# Patient Record
Sex: FEMALE | Race: WHITE | NOT HISPANIC OR LATINO | Employment: OTHER | URBAN - METROPOLITAN AREA
[De-identification: names, ages, dates, MRNs, and addresses within clinical notes are randomized per-mention and may not be internally consistent; named-entity substitution may affect disease eponyms.]

---

## 2018-04-27 ENCOUNTER — OFFICE VISIT (OUTPATIENT)
Dept: CARDIOLOGY | Facility: MEDICAL CENTER | Age: 73
End: 2018-04-27
Payer: MEDICARE

## 2018-04-27 VITALS — WEIGHT: 125 LBS | HEIGHT: 66 IN | BODY MASS INDEX: 20.09 KG/M2

## 2018-04-27 DIAGNOSIS — Z01.818 PRE-OPERATIVE CLEARANCE: ICD-10-CM

## 2018-04-27 PROCEDURE — 99203 OFFICE O/P NEW LOW 30 MIN: CPT | Performed by: INTERNAL MEDICINE

## 2018-04-27 PROCEDURE — 93000 ELECTROCARDIOGRAM COMPLETE: CPT | Performed by: INTERNAL MEDICINE

## 2018-04-27 RX ORDER — ALBUTEROL SULFATE 90 UG/1
2 AEROSOL, METERED RESPIRATORY (INHALATION) EVERY 6 HOURS PRN
Status: ON HOLD | COMMUNITY
End: 2018-06-05

## 2018-04-27 RX ORDER — ALENDRONATE SODIUM 70 MG/1
70 TABLET ORAL
COMMUNITY

## 2018-04-27 ASSESSMENT — ENCOUNTER SYMPTOMS
HEADACHES: 0
FEVER: 0
PND: 0
DEPRESSION: 0
NERVOUS/ANXIOUS: 0
DIZZINESS: 0
BLURRED VISION: 0
EYE DISCHARGE: 0
MYALGIAS: 0
PALPITATIONS: 0
SHORTNESS OF BREATH: 0
NAUSEA: 0
COUGH: 0
CHILLS: 0
HEARTBURN: 1
BRUISES/BLEEDS EASILY: 0
WEIGHT LOSS: 1

## 2018-04-27 NOTE — PROGRESS NOTES
Chief Complaint   Patient presents with   • Procedure       Subjective:   Melba Olguin is a 73 y.o. female who presents today in consultation at the request of Dr. Yehuda Raya for clearance for surgical resection of colon cancer.  Patient reports some significant weight loss in the last 6 months.  Recent colonoscopy demonstrated colon cancer and surgical resection is planned.  Her prior history as above excellent health. No history of high blood pressure heart disease palpitations or chest discomfort  She does housework and walking with no symptoms of shortness of breath or chest discomfort or palpitations.    She's had some variable GI discomfort that has been treated with omeprazole. No apparent documented ulcers.    Social history: She is . No tobacco. No illicit drugs. No alcohol abuse.  Family history: No premature CAD    Office EKG today is normal    History reviewed. No pertinent past medical history.  History reviewed. No pertinent surgical history.  History reviewed. No pertinent family history.  Social History     Social History   • Marital status:      Spouse name: N/A   • Number of children: N/A   • Years of education: N/A     Occupational History   • Not on file.     Social History Main Topics   • Smoking status: Never Smoker   • Smokeless tobacco: Never Used   • Alcohol use Yes   • Drug use: No   • Sexual activity: Not on file     Other Topics Concern   • Not on file     Social History Narrative   • No narrative on file     Allergies   Allergen Reactions   • Eggs    • Pcn [Penicillins]    • Vitamin B Complex      All B vitamins      Outpatient Encounter Prescriptions as of 4/27/2018   Medication Sig Dispense Refill   • alendronate (FOSAMAX) 70 MG Tab Take 70 mg by mouth every 7 days.     • OMEPRAZOLE PO Take  by mouth.     • albuterol 108 (90 Base) MCG/ACT Aero Soln inhalation aerosol Inhale 2 Puffs by mouth every 6 hours as needed for Shortness of Breath.     • FLUOCINOLONE  "ACETONIDE OT Place  in ear.     • Polyethylene Glycol 3350 (MIRALAX PO) Take  by mouth.       No facility-administered encounter medications on file as of 4/27/2018.      Review of Systems   Constitutional: Positive for weight loss. Negative for chills, fever and malaise/fatigue.   Eyes: Negative for blurred vision and discharge.   Respiratory: Negative for cough and shortness of breath.    Cardiovascular: Negative for chest pain, palpitations, leg swelling and PND.   Gastrointestinal: Positive for heartburn. Negative for nausea.   Genitourinary: Negative for dysuria and urgency.   Musculoskeletal: Negative for myalgias.   Skin: Negative for itching and rash.   Neurological: Negative for dizziness and headaches.   Endo/Heme/Allergies: Negative for environmental allergies. Does not bruise/bleed easily.   Psychiatric/Behavioral: Negative for depression. The patient is not nervous/anxious.         Objective:   Ht 1.676 m (5' 6\")   Wt 56.7 kg (125 lb)   BMI 20.18 kg/m²     Physical Exam   Constitutional: She is oriented to person, place, and time.   N pleasant lady accompanied by    Cardiovascular: Normal rate, regular rhythm, normal heart sounds and intact distal pulses.  Exam reveals no gallop and no friction rub.    No murmur heard.  Pulmonary/Chest: Effort normal and breath sounds normal.   Abdominal: Soft. Bowel sounds are normal.   Musculoskeletal: She exhibits no edema.   Neurological: She is alert and oriented to person, place, and time.   Skin: Skin is warm and dry.       Assessment:     1. Pre-operative clearance  EKG       Medical Decision Making:  Today's Assessment / Status / Plan:   No clinical evidence or electrocardiographic evidence of cardiovascular disease.  Is considered low risk for surgery and anesthesia  Thank you for this consult  "

## 2018-04-27 NOTE — LETTER
[facility]   [address]   [address]  [place] [address]  Phone: [phone]  Fax: [phone]              Melba Olguin  1945    Encounter Date: 4/27/2018    Amol Chen M.D.          PROGRESS NOTE:  Chief Complaint   Patient presents with   • Procedure       Subjective:   Melba Olguin is a 73 y.o. female who presents today in consultation at the request of Dr. Yehuda Raya for clearance for surgical resection of colon cancer.  Patient reports some significant weight loss in the last 6 months.  Recent colonoscopy demonstrated colon cancer and surgical resection is planned.  Her prior history as above excellent health. No history of high blood pressure heart disease palpitations or chest discomfort  She does housework and walking with no symptoms of shortness of breath or chest discomfort or palpitations.    She's had some variable GI discomfort that has been treated with omeprazole. No apparent documented ulcers.    Social history: She is . No tobacco. No illicit drugs. No alcohol abuse.  Family history: No premature CAD    Office EKG today is normal    History reviewed. No pertinent past medical history.  History reviewed. No pertinent surgical history.  History reviewed. No pertinent family history.  Social History     Social History   • Marital status:      Spouse name: N/A   • Number of children: N/A   • Years of education: N/A     Occupational History   • Not on file.     Social History Main Topics   • Smoking status: Never Smoker   • Smokeless tobacco: Never Used   • Alcohol use Yes   • Drug use: No   • Sexual activity: Not on file     Other Topics Concern   • Not on file     Social History Narrative   • No narrative on file     Allergies   Allergen Reactions   • Eggs    • Pcn [Penicillins]    • Vitamin B Complex      All B vitamins      Outpatient Encounter Prescriptions as of 4/27/2018    "  Medication Sig Dispense Refill   • alendronate (FOSAMAX) 70 MG Tab Take 70 mg by mouth every 7 days.     • OMEPRAZOLE PO Take  by mouth.     • albuterol 108 (90 Base) MCG/ACT Aero Soln inhalation aerosol Inhale 2 Puffs by mouth every 6 hours as needed for Shortness of Breath.     • FLUOCINOLONE ACETONIDE OT Place  in ear.     • Polyethylene Glycol 3350 (MIRALAX PO) Take  by mouth.       No facility-administered encounter medications on file as of 4/27/2018.      Review of Systems   Constitutional: Positive for weight loss. Negative for chills, fever and malaise/fatigue.   Eyes: Negative for blurred vision and discharge.   Respiratory: Negative for cough and shortness of breath.    Cardiovascular: Negative for chest pain, palpitations, leg swelling and PND.   Gastrointestinal: Positive for heartburn. Negative for nausea.   Genitourinary: Negative for dysuria and urgency.   Musculoskeletal: Negative for myalgias.   Skin: Negative for itching and rash.   Neurological: Negative for dizziness and headaches.   Endo/Heme/Allergies: Negative for environmental allergies. Does not bruise/bleed easily.   Psychiatric/Behavioral: Negative for depression. The patient is not nervous/anxious.         Objective:   Ht 1.676 m (5' 6\")   Wt 56.7 kg (125 lb)   BMI 20.18 kg/m²      Physical Exam   Constitutional: She is oriented to person, place, and time.   N pleasant lady accompanied by    Cardiovascular: Normal rate, regular rhythm, normal heart sounds and intact distal pulses.  Exam reveals no gallop and no friction rub.    No murmur heard.  Pulmonary/Chest: Effort normal and breath sounds normal.   Abdominal: Soft. Bowel sounds are normal.   Musculoskeletal: She exhibits no edema.   Neurological: She is alert and oriented to person, place, and time.   Skin: Skin is warm and dry.       Assessment:     1. Pre-operative clearance  EKG       Medical Decision Making:  Today's Assessment / Status / Plan:   No clinical evidence " or electrocardiographic evidence of cardiovascular disease.  Is considered low risk for surgery and anesthesia  Thank you for this consult      Yehuda Raya M.D.  [address]  [address]  [facility] [address]  VIA Facsimile: [phone]

## 2018-05-02 ENCOUNTER — TELEPHONE (OUTPATIENT)
Dept: CARDIOLOGY | Facility: MEDICAL CENTER | Age: 73
End: 2018-05-02

## 2018-06-01 ENCOUNTER — NON-PROVIDER VISIT (OUTPATIENT)
Dept: WOUND CARE | Facility: MEDICAL CENTER | Age: 73
End: 2018-06-01
Attending: SURGERY
Payer: MEDICARE

## 2018-06-01 DIAGNOSIS — Z01.812 PRE-PROCEDURAL LABORATORY EXAMINATION: ICD-10-CM

## 2018-06-01 LAB
ANION GAP SERPL CALC-SCNC: 7 MMOL/L (ref 0–11.9)
BUN SERPL-MCNC: 19 MG/DL (ref 8–22)
CALCIUM SERPL-MCNC: 9.2 MG/DL (ref 8.5–10.5)
CHLORIDE SERPL-SCNC: 101 MMOL/L (ref 96–112)
CO2 SERPL-SCNC: 28 MMOL/L (ref 20–33)
CREAT SERPL-MCNC: 0.68 MG/DL (ref 0.5–1.4)
ERYTHROCYTE [DISTWIDTH] IN BLOOD BY AUTOMATED COUNT: 49.5 FL (ref 35.9–50)
GLUCOSE SERPL-MCNC: 94 MG/DL (ref 65–99)
HCT VFR BLD AUTO: 38.9 % (ref 37–47)
HGB BLD-MCNC: 12.2 G/DL (ref 12–16)
MCH RBC QN AUTO: 30.3 PG (ref 27–33)
MCHC RBC AUTO-ENTMCNC: 31.4 G/DL (ref 33.6–35)
MCV RBC AUTO: 96.5 FL (ref 81.4–97.8)
PLATELET # BLD AUTO: 280 K/UL (ref 164–446)
PMV BLD AUTO: 10.1 FL (ref 9–12.9)
POTASSIUM SERPL-SCNC: 4.4 MMOL/L (ref 3.6–5.5)
RBC # BLD AUTO: 4.03 M/UL (ref 4.2–5.4)
SODIUM SERPL-SCNC: 136 MMOL/L (ref 135–145)
WBC # BLD AUTO: 8.2 K/UL (ref 4.8–10.8)

## 2018-06-01 PROCEDURE — 99213 OFFICE O/P EST LOW 20 MIN: CPT

## 2018-06-01 PROCEDURE — 36415 COLL VENOUS BLD VENIPUNCTURE: CPT

## 2018-06-01 PROCEDURE — 80048 BASIC METABOLIC PNL TOTAL CA: CPT

## 2018-06-01 PROCEDURE — 85027 COMPLETE CBC AUTOMATED: CPT

## 2018-06-01 RX ORDER — TRIAMCINOLONE ACETONIDE OINTMENT USP, 0.05% 0.5 MG/G
OINTMENT TOPICAL PRN
Status: ON HOLD | COMMUNITY
End: 2018-06-05

## 2018-06-01 RX ORDER — NITROFURANTOIN MACROCRYSTALS 100 MG/1
100 CAPSULE ORAL 2 TIMES DAILY
Status: ON HOLD | COMMUNITY
End: 2018-06-08

## 2018-06-01 RX ORDER — OMEPRAZOLE 20 MG/1
20 CAPSULE, DELAYED RELEASE ORAL DAILY
COMMUNITY

## 2018-06-01 RX ORDER — MESALAMINE 1.2 G/1
2.4 TABLET, DELAYED RELEASE ORAL DAILY
COMMUNITY

## 2018-06-01 RX ORDER — LORATADINE 10 MG/1
10 TABLET ORAL
Status: ON HOLD | COMMUNITY
End: 2018-06-08

## 2018-06-02 NOTE — CERTIFICATION
"[facility]  [address], [address]  [place]  [address]  [phone] Fax#    Pre-surgical Ostomy Marking    Patient Name:  Melba Olguin  Date of Markin2018    Date of Surgery:2018  Surgeon: Dr. Ren Raya MD, Phd  Type of Surgery:robotic proctocolectomy with ileostomy  Diagnosis: UC, colorectal lesion      SUBJECTIVE \"I need to decide if I want an ileoanal reservoir or a permanent end ileostomy by Tuesday\"  HPI: Pt is a 73 year old lady with a hx of colorectal lesion and UC, who will undergo robotic proctocolectomy and ileostomy on  by Dr. Raya. She is referred to [facility] for pre-surgical stoma marking      Medications: (.med)   Current Outpatient Prescriptions:   •  Probiotic Product (ALIGN PO), Take  by mouth every day., Disp: , Rfl:   •  Loperamide HCl (IMODIUM PO), Take  by mouth as needed., Disp: , Rfl:   •  loratadine (CLARITIN) 10 MG Tab, Take 10 mg by mouth every day., Disp: , Rfl:   •  nitrofurantoin macro crystals (MACRODANTIN) 100 MG Cap, Take 100 mg by mouth., Disp: , Rfl:   •  omeprazole (PRILOSEC) 20 MG delayed-release capsule, Take 20 mg by mouth every day., Disp: , Rfl:   •  Multiple Vitamins-Calcium (VIACTIV MULTI-VITAMIN) Chew Tab, Take  by mouth every day., Disp: , Rfl:   •  TRIAMCINOLONE ACETONIDE, TOP, 0.05 % Ointment, by Apply externally route as needed., Disp: , Rfl:   •  mesalamine (LIALDA) 1.2 GM Tablet Delayed Response, Take 2.4 g by mouth every day., Disp: , Rfl:   •  alendronate (FOSAMAX) 70 MG Tab, Take 70 mg by mouth every 7 days., Disp: , Rfl:   •  albuterol 108 (90 Base) MCG/ACT Aero Soln inhalation aerosol, Inhale 2 Puffs by mouth every 6 hours as needed for Shortness of Breath., Disp: , Rfl:   •  Polyethylene Glycol 3350 (MIRALAX PO), Take  by mouth every day., Disp: , Rfl:     Allergies:  Bee; Eggs; Influenza vaccines; Pcn [penicillins]; Shellfish allergy; and Vitamin b complex        OBJECTIVE   Assist patient to " "identify potential stoma site within his/her line of site on a flat pouching surface, within the rectus muscle, away from belt line, bony prominences, existing scars and umbilicus.        Patient teaching:    __x__ Reviewed basic anatomy and function of the GI and/or  tract  __x__ Reviewed nature of surgical procedure.  __x__ Patient to view Ostomy DVD/youtube at home.  __x__ Pouching system demonstrated.      Reviewed/Provided patient with literature pertaining to:    ____ Colostomy   __x__ Ileostomy   ____ Urostomy   ____ Other__________________________________________________________.    Check marks indicated completion of the following:  __x__ Procedure explained to patient.  __x__ Rectus muscle identified with patient in supine position.  __x__ Appropriate abdominal quadrant for planned surgical procedure identified.  __x__ Existing scars identified.  __x__ Potential sites evaluated with patient:  __x__ Sitting.  __x__ Bending forward/twisting at waist.  __x__ Site(s) selected with respect to skin folds, creases, abdominal contours & belt line.     Special considerations:   ____ Wheel chair bound  ____ Child  ____ Continent procedure  ____ Patient with multiple stomas  __x__ Ambulatory    Potential site (s) marked with an \"X\":   Skin prepped with skin protectant wipe.   Alex applied with indelible marker.    Covered with a Tegaderm and picture framed with tape.            Abdomen Quadrant Marked:  ____ URQ  ____ ULQ    __x__ LRQ  ____ LLQ    Other: ___________________________________________________________________    ___________________________________________________________________      [facility] Team notified (ext. [phone]) :   __message left with report on VM 17:37hrs 06/01/2018____       ASSESSMENT/PLAN  -Inpatient ostomy nurses to see patient while hospitalized.   -Patient to return to [facility] to see ostomy RN post-discharge        Clinician Signature:  _Urbano Garner RN, " CFCN, CWS, FACCWS________________________________ Date:  _06/01/2018_________    Physician Signature:  ________________________________ Date:  __________

## 2018-06-05 ENCOUNTER — HOSPITAL ENCOUNTER (INPATIENT)
Facility: MEDICAL CENTER | Age: 73
LOS: 3 days | DRG: 331 | End: 2018-06-08
Attending: SURGERY | Admitting: SURGERY
Payer: MEDICARE

## 2018-06-05 DIAGNOSIS — Z71.89 OSTOMY NURSE CONSULTATION: ICD-10-CM

## 2018-06-05 DIAGNOSIS — G89.18 PAIN FOLLOWING SURGERY OR PROCEDURE: ICD-10-CM

## 2018-06-05 PROCEDURE — 700102 HCHG RX REV CODE 250 W/ 637 OVERRIDE(OP)

## 2018-06-05 PROCEDURE — 700102 HCHG RX REV CODE 250 W/ 637 OVERRIDE(OP): Performed by: NURSE PRACTITIONER

## 2018-06-05 PROCEDURE — 700111 HCHG RX REV CODE 636 W/ 250 OVERRIDE (IP)

## 2018-06-05 PROCEDURE — A9270 NON-COVERED ITEM OR SERVICE: HCPCS | Performed by: NURSE PRACTITIONER

## 2018-06-05 PROCEDURE — 700101 HCHG RX REV CODE 250

## 2018-06-05 PROCEDURE — 0DTE4ZZ RESECTION OF LARGE INTESTINE, PERCUTANEOUS ENDOSCOPIC APPROACH: ICD-10-PCS | Performed by: SURGERY

## 2018-06-05 PROCEDURE — 0D1B4Z4 BYPASS ILEUM TO CUTANEOUS, PERCUTANEOUS ENDOSCOPIC APPROACH: ICD-10-PCS | Performed by: SURGERY

## 2018-06-05 PROCEDURE — 88307 TISSUE EXAM BY PATHOLOGIST: CPT

## 2018-06-05 PROCEDURE — 501838 HCHG SUTURE GENERAL: Performed by: SURGERY

## 2018-06-05 PROCEDURE — 501570 HCHG TROCAR, SEPARATOR: Performed by: SURGERY

## 2018-06-05 PROCEDURE — 502714 HCHG ROBOTIC SURGERY SERVICES: Performed by: SURGERY

## 2018-06-05 PROCEDURE — 0DTP4ZZ RESECTION OF RECTUM, PERCUTANEOUS ENDOSCOPIC APPROACH: ICD-10-PCS | Performed by: SURGERY

## 2018-06-05 PROCEDURE — 88309 TISSUE EXAM BY PATHOLOGIST: CPT

## 2018-06-05 PROCEDURE — 160048 HCHG OR STATISTICAL LEVEL 1-5: Performed by: SURGERY

## 2018-06-05 PROCEDURE — 700111 HCHG RX REV CODE 636 W/ 250 OVERRIDE (IP): Performed by: NURSE PRACTITIONER

## 2018-06-05 PROCEDURE — 160002 HCHG RECOVERY MINUTES (STAT): Performed by: SURGERY

## 2018-06-05 PROCEDURE — 500301 HCHG CLIP, HEMOLOCK: Performed by: SURGERY

## 2018-06-05 PROCEDURE — 700101 HCHG RX REV CODE 250: Performed by: NURSE PRACTITIONER

## 2018-06-05 PROCEDURE — 07BB4ZX EXCISION OF MESENTERIC LYMPHATIC, PERCUTANEOUS ENDOSCOPIC APPROACH, DIAGNOSTIC: ICD-10-PCS | Performed by: SURGERY

## 2018-06-05 PROCEDURE — 8E0W4CZ ROBOTIC ASSISTED PROCEDURE OF TRUNK REGION, PERCUTANEOUS ENDOSCOPIC APPROACH: ICD-10-PCS | Performed by: SURGERY

## 2018-06-05 PROCEDURE — 700104 HCHG RX REV CODE 254

## 2018-06-05 PROCEDURE — 160042 HCHG SURGERY MINUTES - EA ADDL 1 MIN LEVEL 5: Performed by: SURGERY

## 2018-06-05 PROCEDURE — 160035 HCHG PACU - 1ST 60 MINS PHASE I: Performed by: SURGERY

## 2018-06-05 PROCEDURE — 502240 HCHG MISC OR SUPPLY RC 0272: Performed by: SURGERY

## 2018-06-05 PROCEDURE — 0DBU4ZZ EXCISION OF OMENTUM, PERCUTANEOUS ENDOSCOPIC APPROACH: ICD-10-PCS | Performed by: SURGERY

## 2018-06-05 PROCEDURE — 500002 HCHG ADHESIVE, DERMABOND: Performed by: SURGERY

## 2018-06-05 PROCEDURE — 160022 HCHG BLOCK: Performed by: SURGERY

## 2018-06-05 PROCEDURE — 160031 HCHG SURGERY MINUTES - 1ST 30 MINS LEVEL 5: Performed by: SURGERY

## 2018-06-05 PROCEDURE — 501428 HCHG STAPLER, CURVED: Performed by: SURGERY

## 2018-06-05 PROCEDURE — 500594 HCHG GELPORT: Performed by: SURGERY

## 2018-06-05 PROCEDURE — 160009 HCHG ANES TIME/MIN: Performed by: SURGERY

## 2018-06-05 PROCEDURE — 770006 HCHG ROOM/CARE - MED/SURG/GYN SEMI*

## 2018-06-05 PROCEDURE — 94760 N-INVAS EAR/PLS OXIMETRY 1: CPT

## 2018-06-05 PROCEDURE — A4314 CATH W/DRAINAGE 2-WAY LATEX: HCPCS | Performed by: SURGERY

## 2018-06-05 PROCEDURE — A9270 NON-COVERED ITEM OR SERVICE: HCPCS

## 2018-06-05 RX ORDER — SCOLOPAMINE TRANSDERMAL SYSTEM 1 MG/1
1 PATCH, EXTENDED RELEASE TRANSDERMAL
Status: DISCONTINUED | OUTPATIENT
Start: 2018-06-05 | End: 2018-06-08 | Stop reason: HOSPADM

## 2018-06-05 RX ORDER — DEXTROSE MONOHYDRATE, SODIUM CHLORIDE, AND POTASSIUM CHLORIDE 50; 1.49; 4.5 G/1000ML; G/1000ML; G/1000ML
INJECTION, SOLUTION INTRAVENOUS CONTINUOUS
Status: DISCONTINUED | OUTPATIENT
Start: 2018-06-05 | End: 2018-06-08 | Stop reason: HOSPADM

## 2018-06-05 RX ORDER — ACETAMINOPHEN 500 MG
TABLET ORAL
Status: COMPLETED
Start: 2018-06-05 | End: 2018-06-05

## 2018-06-05 RX ORDER — DIPHENHYDRAMINE HYDROCHLORIDE 50 MG/ML
25 INJECTION INTRAMUSCULAR; INTRAVENOUS EVERY 6 HOURS PRN
Status: DISCONTINUED | OUTPATIENT
Start: 2018-06-05 | End: 2018-06-08 | Stop reason: HOSPADM

## 2018-06-05 RX ORDER — DIPHENHYDRAMINE HCL 25 MG
25 TABLET ORAL EVERY 6 HOURS PRN
Status: DISCONTINUED | OUTPATIENT
Start: 2018-06-05 | End: 2018-06-08 | Stop reason: HOSPADM

## 2018-06-05 RX ORDER — POLYETHYLENE GLYCOL 3350 17 G/17G
238 POWDER, FOR SOLUTION ORAL
Status: ON HOLD | COMMUNITY
End: 2018-06-08

## 2018-06-05 RX ORDER — HALOPERIDOL 5 MG/ML
INJECTION INTRAMUSCULAR
Status: COMPLETED
Start: 2018-06-05 | End: 2018-06-05

## 2018-06-05 RX ORDER — GABAPENTIN 300 MG/1
CAPSULE ORAL
Status: COMPLETED
Start: 2018-06-05 | End: 2018-06-05

## 2018-06-05 RX ORDER — OXYCODONE HYDROCHLORIDE 5 MG/1
2.5 TABLET ORAL
Status: DISCONTINUED | OUTPATIENT
Start: 2018-06-05 | End: 2018-06-08 | Stop reason: HOSPADM

## 2018-06-05 RX ORDER — OMEPRAZOLE 20 MG/1
20 CAPSULE, DELAYED RELEASE ORAL DAILY
Status: DISCONTINUED | OUTPATIENT
Start: 2018-06-05 | End: 2018-06-08 | Stop reason: HOSPADM

## 2018-06-05 RX ORDER — ACETAMINOPHEN 500 MG
1000 TABLET ORAL EVERY 6 HOURS
Status: DISCONTINUED | OUTPATIENT
Start: 2018-06-05 | End: 2018-06-08 | Stop reason: HOSPADM

## 2018-06-05 RX ORDER — POLYETHYLENE GLYCOL 3350 17 G/17G
17 POWDER, FOR SOLUTION ORAL DAILY
Status: ON HOLD | COMMUNITY
End: 2018-06-08

## 2018-06-05 RX ORDER — HALOPERIDOL 5 MG/ML
1 INJECTION INTRAMUSCULAR EVERY 6 HOURS PRN
Status: DISCONTINUED | OUTPATIENT
Start: 2018-06-05 | End: 2018-06-08 | Stop reason: HOSPADM

## 2018-06-05 RX ORDER — CELECOXIB 200 MG/1
CAPSULE ORAL
Status: COMPLETED
Start: 2018-06-05 | End: 2018-06-05

## 2018-06-05 RX ORDER — LIDOCAINE HYDROCHLORIDE 10 MG/ML
0.5 INJECTION, SOLUTION INFILTRATION; PERINEURAL
Status: ACTIVE | OUTPATIENT
Start: 2018-06-05 | End: 2018-06-06

## 2018-06-05 RX ORDER — LOPERAMIDE HYDROCHLORIDE 2 MG/1
2 CAPSULE ORAL 4 TIMES DAILY PRN
Status: ON HOLD | COMMUNITY
End: 2018-06-08

## 2018-06-05 RX ORDER — ONDANSETRON 2 MG/ML
4 INJECTION INTRAMUSCULAR; INTRAVENOUS EVERY 4 HOURS PRN
Status: DISCONTINUED | OUTPATIENT
Start: 2018-06-05 | End: 2018-06-08 | Stop reason: HOSPADM

## 2018-06-05 RX ORDER — LORATADINE 10 MG/1
10 TABLET ORAL
Status: DISCONTINUED | OUTPATIENT
Start: 2018-06-05 | End: 2018-06-08 | Stop reason: HOSPADM

## 2018-06-05 RX ORDER — IBUPROFEN 800 MG/1
800 TABLET ORAL
Status: DISCONTINUED | OUTPATIENT
Start: 2018-06-05 | End: 2018-06-08 | Stop reason: HOSPADM

## 2018-06-05 RX ORDER — SODIUM CHLORIDE, SODIUM LACTATE, POTASSIUM CHLORIDE, CALCIUM CHLORIDE 600; 310; 30; 20 MG/100ML; MG/100ML; MG/100ML; MG/100ML
1000 INJECTION, SOLUTION INTRAVENOUS
Status: COMPLETED | OUTPATIENT
Start: 2018-06-05 | End: 2018-06-05

## 2018-06-05 RX ORDER — DEXAMETHASONE SODIUM PHOSPHATE 4 MG/ML
4 INJECTION, SOLUTION INTRA-ARTICULAR; INTRALESIONAL; INTRAMUSCULAR; INTRAVENOUS; SOFT TISSUE
Status: COMPLETED | OUTPATIENT
Start: 2018-06-05 | End: 2018-06-05

## 2018-06-05 RX ORDER — CALCIUM CARBONATE 500 MG/1
500 TABLET, CHEWABLE ORAL
Status: DISCONTINUED | OUTPATIENT
Start: 2018-06-05 | End: 2018-06-08 | Stop reason: HOSPADM

## 2018-06-05 RX ORDER — OXYCODONE HCL 5 MG/5 ML
SOLUTION, ORAL ORAL
Status: COMPLETED
Start: 2018-06-05 | End: 2018-06-05

## 2018-06-05 RX ADMIN — FENTANYL CITRATE 25 MCG: 50 INJECTION, SOLUTION INTRAMUSCULAR; INTRAVENOUS at 16:00

## 2018-06-05 RX ADMIN — ACETAMINOPHEN 1000 MG: 500 TABLET, FILM COATED ORAL at 20:20

## 2018-06-05 RX ADMIN — OXYCODONE HYDROCHLORIDE 10 MG: 5 SOLUTION ORAL at 16:30

## 2018-06-05 RX ADMIN — SODIUM CHLORIDE, SODIUM LACTATE, POTASSIUM CHLORIDE, CALCIUM CHLORIDE 1000 ML: 600; 310; 30; 20 INJECTION, SOLUTION INTRAVENOUS at 11:29

## 2018-06-05 RX ADMIN — HALOPERIDOL 1 MG: 5 INJECTION INTRAMUSCULAR at 16:30

## 2018-06-05 RX ADMIN — POTASSIUM CHLORIDE, DEXTROSE MONOHYDRATE AND SODIUM CHLORIDE: 150; 5; 450 INJECTION, SOLUTION INTRAVENOUS at 20:57

## 2018-06-05 RX ADMIN — HALOPERIDOL LACTATE 1 MG: 5 INJECTION, SOLUTION INTRAMUSCULAR at 16:30

## 2018-06-05 RX ADMIN — FENTANYL CITRATE 25 MCG: 50 INJECTION, SOLUTION INTRAMUSCULAR; INTRAVENOUS at 15:50

## 2018-06-05 RX ADMIN — DEXAMETHASONE SODIUM PHOSPHATE 4 MG: 4 INJECTION, SOLUTION INTRAMUSCULAR; INTRAVENOUS at 20:57

## 2018-06-05 RX ADMIN — CELECOXIB 400 MG: 200 CAPSULE ORAL at 12:00

## 2018-06-05 RX ADMIN — OXYCODONE HYDROCHLORIDE 2.5 MG: 5 TABLET ORAL at 20:20

## 2018-06-05 RX ADMIN — FENTANYL CITRATE 25 MCG: 50 INJECTION, SOLUTION INTRAMUSCULAR; INTRAVENOUS at 15:38

## 2018-06-05 RX ADMIN — ONDANSETRON 4 MG: 2 INJECTION INTRAMUSCULAR; INTRAVENOUS at 20:19

## 2018-06-05 RX ADMIN — GABAPENTIN 300 MG: 300 CAPSULE ORAL at 12:00

## 2018-06-05 RX ADMIN — FENTANYL CITRATE 25 MCG: 50 INJECTION, SOLUTION INTRAMUSCULAR; INTRAVENOUS at 15:33

## 2018-06-05 RX ADMIN — IBUPROFEN 800 MG: 800 TABLET, FILM COATED ORAL at 20:20

## 2018-06-05 RX ADMIN — ACETAMINOPHEN 1000 MG: 500 TABLET, FILM COATED ORAL at 12:00

## 2018-06-05 ASSESSMENT — PATIENT HEALTH QUESTIONNAIRE - PHQ9
2. FEELING DOWN, DEPRESSED, IRRITABLE, OR HOPELESS: NOT AT ALL
SUM OF ALL RESPONSES TO PHQ9 QUESTIONS 1 AND 2: 0
1. LITTLE INTEREST OR PLEASURE IN DOING THINGS: NOT AT ALL

## 2018-06-05 ASSESSMENT — PAIN SCALES - GENERAL
PAINLEVEL_OUTOF10: 3
PAINLEVEL_OUTOF10: 5
PAINLEVEL_OUTOF10: 0
PAINLEVEL_OUTOF10: 2
PAINLEVEL_OUTOF10: 5
PAINLEVEL_OUTOF10: 0
PAINLEVEL_OUTOF10: 0
PAINLEVEL_OUTOF10: 4
PAINLEVEL_OUTOF10: 3
PAINLEVEL_OUTOF10: 7
PAINLEVEL_OUTOF10: 0
PAINLEVEL_OUTOF10: 5

## 2018-06-05 ASSESSMENT — LIFESTYLE VARIABLES
AVERAGE NUMBER OF DAYS PER WEEK YOU HAVE A DRINK CONTAINING ALCOHOL: 1
HAVE PEOPLE ANNOYED YOU BY CRITICIZING YOUR DRINKING: NO
EVER_SMOKED: NEVER
EVER FELT BAD OR GUILTY ABOUT YOUR DRINKING: NO
CONSUMPTION TOTAL: NEGATIVE
TOTAL SCORE: 0
TOTAL SCORE: 0
ON A TYPICAL DAY WHEN YOU DRINK ALCOHOL HOW MANY DRINKS DO YOU HAVE: 1
HOW MANY TIMES IN THE PAST YEAR HAVE YOU HAD 5 OR MORE DRINKS IN A DAY: 0
ALCOHOL_USE: YES
EVER HAD A DRINK FIRST THING IN THE MORNING TO STEADY YOUR NERVES TO GET RID OF A HANGOVER: NO
HAVE YOU EVER FELT YOU SHOULD CUT DOWN ON YOUR DRINKING: NO
TOTAL SCORE: 0

## 2018-06-05 ASSESSMENT — COPD QUESTIONNAIRES
DURING THE PAST 4 WEEKS HOW MUCH DID YOU FEEL SHORT OF BREATH: SOME OF THE TIME
COPD SCREENING SCORE: 3
DO YOU EVER COUGH UP ANY MUCUS OR PHLEGM?: NO/ONLY WITH OCCASIONAL COLDS OR INFECTIONS
HAVE YOU SMOKED AT LEAST 100 CIGARETTES IN YOUR ENTIRE LIFE: NO/DON'T KNOW

## 2018-06-05 NOTE — OP REPORT
Operative Report    Date: 6/5/2018    Surgeon: Yehuda Raya M.D.    Assistant: Josee Mcgraw    Anesthesiologist: Andrei Dickerson MD    Pre-operative Diagnosis:   Ulcerative colitis  Colon cancer  Acid reflux  Asthma  Urinary incontinence    Post-operative Diagnosis: Same    Procedure:   1)  ROBOTIC ASSISTED LAPAROSCOPIC PROCTOCOLECTOMY WITH END ILEOSTOMY  2) omentectomy     Indications:   73-year-old female with new diagnosis of stricture long-standing history of ulcerative colitis found to have colon cancer and the decision was made to proceed with a little robotic assisted laparoscopic proctocolectomy with end ileostomy. An extensive PARQ conference was held with the patient and her family, in regard to options for restoration of continence including pouch versus per minute and ileostomy. The patient was made aware of the alternatives, including operative and non-operative: Expectant management. The risks of bleeding, infection, damage to surrounding structures, need for reoperation, peristomal hernia, hernia, fistula, leak, stroke, MI, and death were discussed with the patient. The patient was given a chance to ask questions, and all her questions were answered. The patient demonstrates adequate understanding, seems pleased with the plan, and wishes to proceed.    OPERATIVE FINDINGS: Pan colitis with obvious chronic disease and a stricture point at the rectosigmoid junction     Procedure in detail: After informed consent was obtained, the patient was taken to the operating room, placed in supine position on a pink pad. The patient underwent general endotracheal anesthesia without incident.  The patient's arms were tucked and the chest and shoulders were padded and secured to the operating room table.   The patient was then moved to lithotomy in yellowfin stirrups with all skin and joint surfaces padded and protected appropriately.The rectum was washed out with Betadine and the abdomen was prepped and  draped in a sterile fashion. A timeout was performed verifying the correct patient, procedure, site, positioning in availability of equipment prior to the start of surgery.    Operation was begun by placing a 5 mm periumbilical incision through which a 5 mm trocar was introduced into the abdomen using the Optiview technique. After pneumoperitoneum was achieved, additional trocars were placed, 15 mm in the right lower quadrant at the previously marked ileostomy site and 8 mm in the left upper quadrant. An 8 and a 5 mm assistant port were placed and the camera port was upsized to an 8 mm trocar as well. All trocars were placed with 0.5% Marcaine without epinephrine for local anesthesia.    The patient was positioned in steep Trendelenburg and right lateral decubitus and before the da Archana robotic system was docked the patient was noted to be securely  positioned on the table.  We took down the left lateral attachments of the sigmoid colon, identifying the left ureter which was preserved throughout the remainder of the procedure.  We then opened the retroperitoneal space in the right side and dissected in the bloodless plane, isolated the MORTEZA pedicle away from the pelvic nerves and ureters. The MORTEZA was then divided near its origin with a robotic stapler with a vascular load. We then used electrocautery to mobilize the left colon up to the splenic flexure, taking down omental attachments and adhesions.  Electrocautery was now used in the presacral space in the bloodless plane. Dissection was carried down and left and right laterally, freeing up the rectum. Dissection carried all the way down to the pelvic floor and digital rectal examination confirmed that the dissection had freed the rectum to the dentate line and then divided the rectum itself with a green load robotic staple fire.    We proceeded by grasping the cecum and elevating it and retracting it anteriorly and inferiorly to attempt the ileocolic vessel. The  ileocolic vessel was identified and then dissected with a harmonic scalpel and clipped with a robotic clip and divided using noah. Hemostasis was obtained. The plain underlying the right colon was then developed in superior fashion taking are to avoid injury to the duodenum. This was carried up to the level of the gallbladder fossa. We then turned our attention to the base of the right colon. The right ureter was identified and protected. The lateral attachments of the white line of Toldt were then divided with the harmonic scalpel. We then turned our attention to the gastrocolic ligament in the hepatic flexure and these are similarly divided with a harmonic scalpel.    I then made a midline incision in the epigastrium. This was carried down through the subcutaneous tissues to the linea alba and the abdominal cavity was then entered. Particularly remaining gastrocolic ligament. I then divided the terminal ileum. I extracted the entire specimen and passed it off the table. I removed the 15 mm trocar and created a ileostomy site by excising a trephine of skin. I then carried it down through the subcutaneous tissues with electrocautery. I incised the anterior rectus sheath. Rectus sheath muscles were then split with curved Mayos. I then divided the the posterior rectus sheath. Ileostomy was brought through this incision and found to be without twist or tension or stricture.    All members been changed clubbing gowns and a closing tray was used. I closed the midline incision with 2 #1 running PDS sutures. I closed the subcutaneous tissues with 3-0 interrupted Vicryls. All skin incisions were then closed with 4-0 Monocryl. Ileostomy was then matured in the manner of Jelena with interrupted 3-0 Vicryl sutures. And in the Dermabond is applied to all of the incisions. An ileostomy appliance was applied to the ileostomy.    Patient was extubated and taken to the postanesthesia care unit in stable condition. All sponge and  instrument counts were correct ×2.    Yehuda Raya MD PhD  [facility]  Colon and Rectal Surgeon  [phone]

## 2018-06-05 NOTE — PROGRESS NOTES
Patient wakes up confused to situation but easily reorientated.  tolerating PO, states she has very little pain. Daughter Calli notified of updates, awaiting room assignment

## 2018-06-05 NOTE — PROGRESS NOTES
The Medication Reconciliation process has been completed by interviewing the patient and family    Allergies have been reviewed  Antibiotic use in 30 days - nitrofurantoin    Home Pharmacy:  [facility]

## 2018-06-06 LAB
ANION GAP SERPL CALC-SCNC: 6 MMOL/L (ref 0–11.9)
BASOPHILS # BLD AUTO: 0.7 % (ref 0–1.8)
BASOPHILS # BLD: 0.09 K/UL (ref 0–0.12)
BUN SERPL-MCNC: 10 MG/DL (ref 8–22)
CALCIUM SERPL-MCNC: 8.8 MG/DL (ref 8.5–10.5)
CHLORIDE SERPL-SCNC: 103 MMOL/L (ref 96–112)
CO2 SERPL-SCNC: 26 MMOL/L (ref 20–33)
CREAT SERPL-MCNC: 0.72 MG/DL (ref 0.5–1.4)
EOSINOPHIL # BLD AUTO: 0 K/UL (ref 0–0.51)
EOSINOPHIL NFR BLD: 0 % (ref 0–6.9)
ERYTHROCYTE [DISTWIDTH] IN BLOOD BY AUTOMATED COUNT: 47.4 FL (ref 35.9–50)
GLUCOSE SERPL-MCNC: 194 MG/DL (ref 65–99)
HCT VFR BLD AUTO: 33.5 % (ref 37–47)
HGB BLD-MCNC: 10.5 G/DL (ref 12–16)
IMM GRANULOCYTES # BLD AUTO: 0.05 K/UL (ref 0–0.11)
IMM GRANULOCYTES NFR BLD AUTO: 0.4 % (ref 0–0.9)
LYMPHOCYTES # BLD AUTO: 0.39 K/UL (ref 1–4.8)
LYMPHOCYTES NFR BLD: 3.2 % (ref 22–41)
MCH RBC QN AUTO: 30.2 PG (ref 27–33)
MCHC RBC AUTO-ENTMCNC: 31.3 G/DL (ref 33.6–35)
MCV RBC AUTO: 96.3 FL (ref 81.4–97.8)
MONOCYTES # BLD AUTO: 0.8 K/UL (ref 0–0.85)
MONOCYTES NFR BLD AUTO: 6.5 % (ref 0–13.4)
NEUTROPHILS # BLD AUTO: 11.04 K/UL (ref 2–7.15)
NEUTROPHILS NFR BLD: 89.2 % (ref 44–72)
NRBC # BLD AUTO: 0 K/UL
NRBC BLD-RTO: 0 /100 WBC
PLATELET # BLD AUTO: 226 K/UL (ref 164–446)
PMV BLD AUTO: 9.9 FL (ref 9–12.9)
POTASSIUM SERPL-SCNC: 4.4 MMOL/L (ref 3.6–5.5)
RBC # BLD AUTO: 3.48 M/UL (ref 4.2–5.4)
SODIUM SERPL-SCNC: 135 MMOL/L (ref 135–145)
WBC # BLD AUTO: 12.4 K/UL (ref 4.8–10.8)

## 2018-06-06 PROCEDURE — 700111 HCHG RX REV CODE 636 W/ 250 OVERRIDE (IP): Performed by: NURSE PRACTITIONER

## 2018-06-06 PROCEDURE — A9270 NON-COVERED ITEM OR SERVICE: HCPCS | Performed by: NURSE PRACTITIONER

## 2018-06-06 PROCEDURE — 36415 COLL VENOUS BLD VENIPUNCTURE: CPT

## 2018-06-06 PROCEDURE — 80048 BASIC METABOLIC PNL TOTAL CA: CPT

## 2018-06-06 PROCEDURE — 770006 HCHG ROOM/CARE - MED/SURG/GYN SEMI*

## 2018-06-06 PROCEDURE — 700102 HCHG RX REV CODE 250 W/ 637 OVERRIDE(OP): Performed by: NURSE PRACTITIONER

## 2018-06-06 PROCEDURE — 85025 COMPLETE CBC W/AUTO DIFF WBC: CPT

## 2018-06-06 RX ADMIN — ACETAMINOPHEN 1000 MG: 500 TABLET, FILM COATED ORAL at 05:20

## 2018-06-06 RX ADMIN — OMEPRAZOLE 20 MG: 20 CAPSULE, DELAYED RELEASE ORAL at 09:47

## 2018-06-06 RX ADMIN — IBUPROFEN 800 MG: 800 TABLET, FILM COATED ORAL at 09:47

## 2018-06-06 RX ADMIN — ACETAMINOPHEN 1000 MG: 500 TABLET, FILM COATED ORAL at 00:11

## 2018-06-06 RX ADMIN — OXYCODONE HYDROCHLORIDE 2.5 MG: 5 TABLET ORAL at 14:48

## 2018-06-06 RX ADMIN — IBUPROFEN 800 MG: 800 TABLET, FILM COATED ORAL at 12:45

## 2018-06-06 RX ADMIN — IBUPROFEN 800 MG: 800 TABLET, FILM COATED ORAL at 18:27

## 2018-06-06 RX ADMIN — OXYCODONE HYDROCHLORIDE 2.5 MG: 5 TABLET ORAL at 00:11

## 2018-06-06 RX ADMIN — ENOXAPARIN SODIUM 40 MG: 100 INJECTION SUBCUTANEOUS at 09:51

## 2018-06-06 RX ADMIN — ACETAMINOPHEN 1000 MG: 500 TABLET, FILM COATED ORAL at 12:45

## 2018-06-06 RX ADMIN — OXYCODONE HYDROCHLORIDE 2.5 MG: 5 TABLET ORAL at 05:20

## 2018-06-06 RX ADMIN — ACETAMINOPHEN 1000 MG: 500 TABLET, FILM COATED ORAL at 23:12

## 2018-06-06 RX ADMIN — ANTACID TABLETS 500 MG: 500 TABLET, CHEWABLE ORAL at 19:48

## 2018-06-06 ASSESSMENT — PAIN SCALES - GENERAL
PAINLEVEL_OUTOF10: 3
PAINLEVEL_OUTOF10: 3
PAINLEVEL_OUTOF10: 5
PAINLEVEL_OUTOF10: 6
PAINLEVEL_OUTOF10: 3
PAINLEVEL_OUTOF10: 7
PAINLEVEL_OUTOF10: 3
PAINLEVEL_OUTOF10: 6
PAINLEVEL_OUTOF10: 7

## 2018-06-06 NOTE — PROGRESS NOTES
"Surgical Progress Note:    POD #1 S/P ROBOTIC ASSISTED LAPAROSCOPIC PROCTOCOLECTOMY WITH END ILEOSTOMY.  Doing well. Neg N/V, + ostomy output, Pain controlled, Denies chest pain or SOB.  Has not ambulated. Tolerating PO.    PE:  /56   Pulse 61   Temp 36.1 °C (96.9 °F)   Resp 14   Ht 1.676 m (5' 6\")   Wt 56.7 kg (125 lb)   SpO2 98%   Breastfeeding? No   BMI 20.18 kg/m²     I/O:   Intake/Output Summary (Last 24 hours) at 06/06/18 0631  Last data filed at 06/06/18 0400   Gross per 24 hour   Intake             1625 ml   Output             1085 ml   Net              540 ml     Review of Systems   Constitutional: Negative.  Negative for chills and fever.   Respiratory: Negative for shortness of breath.    Cardiovascular: Negative for chest pain.   Gastrointestinal: Negative for nausea and vomiting.        +flatus/+stool        Stoma pink and viable with flatus and stool  Genitourinary: Negative, noel in      Physical Exam   Constitutional:  appears well-developed.   Neck: Neck supple.   Cardiovascular: Normal rate.    Pulmonary/Chest: Effort normal.   Abdominal: Soft.  exhibits no distension. Appropriate tenderness.   Incisions clean, dry, and intact    Musculoskeletal: Normal range of motion.   Neurological:  alert.   Skin:  Warm and dry.   Extremities: cardenas, no edema    Labs:  Recent Labs      06/06/18   0348   WBC  12.4*   RBC  3.48*   HEMOGLOBIN  10.5*   HEMATOCRIT  33.5*   MCV  96.3   MCH  30.2   RDW  47.4   PLATELETCT  226   MPV  9.9   NEUTSPOLYS  89.20*   LYMPHOCYTES  3.20*   MONOCYTES  6.50   EOSINOPHILS  0.00   BASOPHILS  0.70     Recent Labs      06/06/18   0348   SODIUM  135   POTASSIUM  4.4   CHLORIDE  103   CO2  26   GLUCOSE  194*   BUN  10         A/P:   - Regular low residue diet as tolerated.  - IS Q One hour while awake  - Ambulate.  Out of bed for all meals please  - Pain controlled.  Cont PO pain medication  - Labs noted, recheck in am  - DVT propholaxis, ok to start Lovenox, sequentials " and ambulate  Pt to go home on Lovenox for 30 days  - Adequate urine output.  Cont, to monitor.  Will keep noel another day due to low pelvic surgery  - Ostomy nurse to see and start ostomy teaching  - IF continues to do well and tolerates PO and is comfortable with ostomy care, will plan for D/C prob Fri.     Discussed with Patient and Dr. Elver HUGHES  [facility]  [phone]

## 2018-06-06 NOTE — PROGRESS NOTES
Room available, per Fabiana PRYOR oncoming RN, patient can't be transported to room until after 1915 due to shift report. Patient to remain in pacu until after shift change

## 2018-06-06 NOTE — CARE PLAN
Problem: Safety  Goal: Will remain free from falls  Outcome: PROGRESSING AS EXPECTED  Non skid socks on. Bed locked and in low position. Call light within reach. Educated patient to use call light for assistance.     Problem: Knowledge Deficit  Goal: Knowledge of the prescribed therapeutic regimen will improve  Outcome: PROGRESSING AS EXPECTED  Reviewed prescribed orders with patient and family. All questions answered.

## 2018-06-06 NOTE — PROGRESS NOTES
Patient arrived to -[id number]. Patient walked from gurney to chair with hand held assist. Family at bedside. Assumed patient care  Patient is AOx4.  On 6 L O2 via facemask per ERAS   Abdominal pain rated at 7/10, medicated per MAR  Currently on clear liquid diet, advance as tolerated to GI soft diet  Patient complains of nausea, medicated per MAR  Patient has a midline incision and 2 abdominal lap sites with Dermabond, CDI  RLQ ileostomy, stoma is red. Small amount of brown drainage noted.  Hughes in place draining to gravity, to be discontinued on POD 2. Stat lock in place. Draining clear and yellow urine  IV fluids running, will discontine at 0300 per ERAS.   Oriented to room call light and smoking policy.  Reviewed plan of care (equipment, incentive spirometer, sequential compression devices, medications, activity, diet, fall precautions, skin care, and pain) with patient and family. Welcome packet given and reviewed with, all questions answered.

## 2018-06-06 NOTE — PROGRESS NOTES
2 RN skin check complete  Midline incision with 2 abdominal lap sites to patient's abdomen covered with dermabond  RLQ ileostomy   No other areas of skin breakdown noted   All bony prominences intact.

## 2018-06-06 NOTE — PROGRESS NOTES
AO x 4, family at bedside.  Ostomy with + stool output, brown liquid, stoma pink.  Midline incision and lap sites well approximated, no redness or drainage. Abdomen soft.  IS given and instructed on use, weak effort, achieves 500.  Noel to gravity with + urine, to be removed POD 2, noel care provided.  Ibuprofen provided for 7/10 abdomen.  No n/v and 75% of breakfast try consumed. Plan of care discussed, questions answered, verbalized understanding.

## 2018-06-06 NOTE — PROGRESS NOTES
Report received from Agnes RN in pacu, to send up in wheelchair per CHERELLE.  Updated family that patient wont be up until after 1915 due to change of shift.

## 2018-06-06 NOTE — PROGRESS NOTES
Patient stood at bedside with assist, still tolerating PO, pain well controlled with medication. Neuro status WDL, surgical sites well approximated, stoma pink, moist, no distress. Still awaiting room assignment, daughter updated

## 2018-06-06 NOTE — WOUND TEAM
"[facility]  Inpatient Services  New Ostomy Management & Teaching    HPI:  Reviewed  PMH: Reviewed   SH: Reviewed    Subjective:  \"I have trouble seeing and hearing.\"      Objective:  In bedside chair.  Transferred to bed.       Ostomy type:  End ileostomy    Stoma location:  RUQ   Stoma assessment:    Appearance:  Red, edematous     Size:  1\"    Protrusion:  Budded     Output:  Min, brown mush      MC jxn  Intact     Peristomal skin:  Intact      Ostomy Appliance (type and size):  2 piece 2 1/4 with paste ring     Interventions:  Went over all paperwork.  SS signed.  Patient and family observed as I preformed all steps.  Barrier removed.  Izzy-stomal skin cleansed with warm wash cloth.  Template made.  Barrier cut and paste ring applied to barrier.  Applied to skin, pouch attached and closed.              Pt education: Questions and concerns addressed with patient and family at bedside,      Evaluation:  Patient with  and daughter at bedside.  Family states they will be assisting patient with care.  Very engaged and involved.         Plan: Ostomy nurses to continue to follow for ostomy needs and teaching.  Will see patient daily as likely DC will be Friday.       Anticipated discharge needs: Supplies, supplier information, outpatient ostomy clinic   "

## 2018-06-07 LAB
ANION GAP SERPL CALC-SCNC: 7 MMOL/L (ref 0–11.9)
BASOPHILS # BLD AUTO: 0.2 % (ref 0–1.8)
BASOPHILS # BLD: 0.03 K/UL (ref 0–0.12)
BUN SERPL-MCNC: 18 MG/DL (ref 8–22)
CALCIUM SERPL-MCNC: 8.6 MG/DL (ref 8.5–10.5)
CHLORIDE SERPL-SCNC: 100 MMOL/L (ref 96–112)
CO2 SERPL-SCNC: 25 MMOL/L (ref 20–33)
CREAT SERPL-MCNC: 0.85 MG/DL (ref 0.5–1.4)
EOSINOPHIL # BLD AUTO: 0.02 K/UL (ref 0–0.51)
EOSINOPHIL NFR BLD: 0.2 % (ref 0–6.9)
ERYTHROCYTE [DISTWIDTH] IN BLOOD BY AUTOMATED COUNT: 46.7 FL (ref 35.9–50)
GLUCOSE SERPL-MCNC: 107 MG/DL (ref 65–99)
HCT VFR BLD AUTO: 27.2 % (ref 37–47)
HGB BLD-MCNC: 8.8 G/DL (ref 12–16)
IMM GRANULOCYTES # BLD AUTO: 0.05 K/UL (ref 0–0.11)
IMM GRANULOCYTES NFR BLD AUTO: 0.4 % (ref 0–0.9)
LYMPHOCYTES # BLD AUTO: 1.64 K/UL (ref 1–4.8)
LYMPHOCYTES NFR BLD: 12.9 % (ref 22–41)
MCH RBC QN AUTO: 30.3 PG (ref 27–33)
MCHC RBC AUTO-ENTMCNC: 32.4 G/DL (ref 33.6–35)
MCV RBC AUTO: 93.8 FL (ref 81.4–97.8)
MONOCYTES # BLD AUTO: 0.84 K/UL (ref 0–0.85)
MONOCYTES NFR BLD AUTO: 6.6 % (ref 0–13.4)
NEUTROPHILS # BLD AUTO: 10.11 K/UL (ref 2–7.15)
NEUTROPHILS NFR BLD: 79.7 % (ref 44–72)
NRBC # BLD AUTO: 0 K/UL
NRBC BLD-RTO: 0 /100 WBC
PLATELET # BLD AUTO: 199 K/UL (ref 164–446)
PMV BLD AUTO: 10.1 FL (ref 9–12.9)
POTASSIUM SERPL-SCNC: 3.8 MMOL/L (ref 3.6–5.5)
RBC # BLD AUTO: 2.9 M/UL (ref 4.2–5.4)
SODIUM SERPL-SCNC: 132 MMOL/L (ref 135–145)
WBC # BLD AUTO: 12.7 K/UL (ref 4.8–10.8)

## 2018-06-07 PROCEDURE — 302102 BAG OST N IMG 2.25IN 2PC (FECAL): Performed by: SURGERY

## 2018-06-07 PROCEDURE — 36415 COLL VENOUS BLD VENIPUNCTURE: CPT

## 2018-06-07 PROCEDURE — 700111 HCHG RX REV CODE 636 W/ 250 OVERRIDE (IP): Performed by: NURSE PRACTITIONER

## 2018-06-07 PROCEDURE — A9270 NON-COVERED ITEM OR SERVICE: HCPCS | Performed by: NURSE PRACTITIONER

## 2018-06-07 PROCEDURE — 700102 HCHG RX REV CODE 250 W/ 637 OVERRIDE(OP): Performed by: NURSE PRACTITIONER

## 2018-06-07 PROCEDURE — 85025 COMPLETE CBC W/AUTO DIFF WBC: CPT

## 2018-06-07 PROCEDURE — 770006 HCHG ROOM/CARE - MED/SURG/GYN SEMI*

## 2018-06-07 PROCEDURE — 302098 PASTE RING (FLAT): Performed by: SURGERY

## 2018-06-07 PROCEDURE — 302111 WAFER OST 2.25IN N IMG RD 2 PC (BARRIER): Performed by: SURGERY

## 2018-06-07 PROCEDURE — 80048 BASIC METABOLIC PNL TOTAL CA: CPT

## 2018-06-07 RX ADMIN — OMEPRAZOLE 20 MG: 20 CAPSULE, DELAYED RELEASE ORAL at 07:56

## 2018-06-07 RX ADMIN — ACETAMINOPHEN 1000 MG: 500 TABLET, FILM COATED ORAL at 05:01

## 2018-06-07 RX ADMIN — OXYCODONE HYDROCHLORIDE 2.5 MG: 5 TABLET ORAL at 03:52

## 2018-06-07 RX ADMIN — ACETAMINOPHEN 1000 MG: 500 TABLET, FILM COATED ORAL at 18:51

## 2018-06-07 RX ADMIN — IBUPROFEN 800 MG: 800 TABLET, FILM COATED ORAL at 07:56

## 2018-06-07 RX ADMIN — ACETAMINOPHEN 1000 MG: 500 TABLET, FILM COATED ORAL at 12:32

## 2018-06-07 RX ADMIN — IBUPROFEN 800 MG: 800 TABLET, FILM COATED ORAL at 12:32

## 2018-06-07 RX ADMIN — ACETAMINOPHEN 1000 MG: 500 TABLET, FILM COATED ORAL at 23:38

## 2018-06-07 RX ADMIN — ENOXAPARIN SODIUM 40 MG: 100 INJECTION SUBCUTANEOUS at 07:59

## 2018-06-07 RX ADMIN — OXYCODONE HYDROCHLORIDE 2.5 MG: 5 TABLET ORAL at 15:34

## 2018-06-07 RX ADMIN — IBUPROFEN 800 MG: 800 TABLET, FILM COATED ORAL at 18:51

## 2018-06-07 ASSESSMENT — PAIN SCALES - GENERAL
PAINLEVEL_OUTOF10: 4
PAINLEVEL_OUTOF10: 4
PAINLEVEL_OUTOF10: 7
PAINLEVEL_OUTOF10: 4
PAINLEVEL_OUTOF10: 5
PAINLEVEL_OUTOF10: 4
PAINLEVEL_OUTOF10: 8
PAINLEVEL_OUTOF10: 5
PAINLEVEL_OUTOF10: 7

## 2018-06-07 NOTE — CARE PLAN
Problem: Fluid Volume:  Goal: Will maintain balanced intake and output    Intervention: Monitor, educate, and encourage compliance with therapeutic intake of liquids  Po fluid intake encouraged.  Patient able to void several times post noel removal

## 2018-06-07 NOTE — WOUND TEAM
"[facility]  Inpatient Services  New Ostomy Management & Teaching    HPI:  Reviewed  PMH: Reviewed   SH: Reviewed    Subjective:  \"I'm a little nervous.\"      Objective:  In bedside chair.  Transferred to bed.       Ostomy type:  End ileostomy    Stoma location:  RUQ   Stoma assessment:    Appearance:  Red, edematous     Size:  1\"    Protrusion:  Budded     Output:  Min, brown mush      MC jxn  Intact     Peristomal skin:  Intact      Ostomy Appliance (type and size):  2 piece 2 1/4 with paste ring.  Used fomaflex moldable barrier today.     Interventions:  Reviewed all paperwork.  Marked catalog.  Family observed and asked questions throughout.  Patient removed Barrier.  Assisted patient in cleansing Izzy-stomal skin with warm wash cloth.  Patient traced barrier with template and attempted to cut.  Patient having trouble cutting barrier so I offered her to try moldable.  Patient and family observed moldable being stretched to fit.  Patient applied paste ring.  Assisted patient in applying barrier.  Patient attached pouch and closed end.             Pt education: Questions and concerns addressed with patient and family at bedside.  Patient agreed to practice emptying pouch today.  Confirmed with CNA and RN.    Evaluation:  Patient with  and daughter at bedside.  Family states they will be assisting patient with care.  Very engaged and involved.         Plan: Ostomy nurses to continue to follow for ostomy needs and teaching.  Will see patient daily as likely DC will be Friday.       Anticipated discharge needs: Supplies, supplier information, outpatient ostomy clinic   "

## 2018-06-07 NOTE — PROGRESS NOTES
Received report from day shift RN. Assumed patient care  Patient is AOx4  Family at bedside  Right abdomen pain rated at a 3/10, cold pack applied  +stool from RLQ ostomy.   Midline incision and lap sites to abdomen  Hughes draining to gravity. To be removed 6/7 at 0500  Complaints of heartburn, medicated per MAR  Denies nausea  Placed patient back on 2 L of O2, educated patient on IS use. Able to pull 500 at this time  Ambulates SBA  Patient calls appropriately for assistance  Call light within reach. Bed locked and in low position  All needs met at this time.

## 2018-06-07 NOTE — PROGRESS NOTES
"Surgical Progress Note:    POD #2 S/P ROBOTIC ASSISTED LAPAROSCOPIC PROCTOCOLECTOMY WITH END ILEOSTOMY.  Doing well. Neg N/V, + thick ostomy output, Pain controlled, Denies chest pain or SOB.   Ambulating. Tolerating PO. Has not started taking care of her ostomy yet    PE:  /62   Pulse 70   Temp 36.7 °C (98.1 °F)   Resp 17   Ht 1.676 m (5' 6\")   Wt 56.7 kg (125 lb)   SpO2 98%   Breastfeeding? No   BMI 20.18 kg/m²     I/O:   Intake/Output Summary (Last 24 hours) at 06/06/18 0631  Last data filed at 06/06/18 0400   Gross per 24 hour   Intake             1625 ml   Output             1085 ml   Net              540 ml     Review of Systems   Constitutional: Negative.  Negative for chills and fever.   Respiratory: Negative for shortness of breath.    Cardiovascular: Negative for chest pain.   Gastrointestinal: Negative for nausea and vomiting.        +flatus/+stool        Stoma pink and viable with flatus and thick stool  Genitourinary: Negative, noel out     Physical Exam   Constitutional:  appears well-developed.   Neck: Neck supple.   Cardiovascular: Normal rate.    Pulmonary/Chest: Effort normal.   Abdominal: Soft.  exhibits no distension. Appropriate tenderness.   Incisions clean, dry, and intact    Musculoskeletal: Normal range of motion.   Neurological:  alert.   Skin:  Warm and dry.   Extremities: cardenas, no edema    Labs:  Recent Labs      06/06/18   0348  06/07/18   0236   WBC  12.4*  12.7*   RBC  3.48*  2.90*   HEMOGLOBIN  10.5*  8.8*   HEMATOCRIT  33.5*  27.2*   MCV  96.3  93.8   MCH  30.2  30.3   RDW  47.4  46.7   PLATELETCT  226  199   MPV  9.9  10.1   NEUTSPOLYS  89.20*  79.70*   LYMPHOCYTES  3.20*  12.90*   MONOCYTES  6.50  6.60   EOSINOPHILS  0.00  0.20   BASOPHILS  0.70  0.20     Recent Labs      06/06/18   0348  06/07/18   0236   SODIUM  135  132*   POTASSIUM  4.4  3.8   CHLORIDE  103  100   CO2  26  25   GLUCOSE  194*  107*   BUN  10  18         A/P:   - Regular low residue diet as " tolerated.  - IS Q One hour while awake  - Ambulate.  Out of bed for all meals please  - Pain controlled.  Cont PO pain medication  - Labs noted, recheck in am  - drop in H&H, but suspect equalization, dont think she is bleeding  - DVT propholaxis, ok to cont Lovenox, sequentials and ambulate  Pt to go home on Lovenox for 30 days  - Adequate urine output.  Cont, to monitor.   - Ostomy nurse to see and cont ostomy teaching  - IF continues to do well and tolerates PO and is comfortable with ostomy care, will plan for D/C prob Fri.     Discussed with Patient and Dr. Elver HUGHES  [facility]  [phone]

## 2018-06-07 NOTE — PROGRESS NOTES
AO x 4 , sitting up in chair.  No nausea.  Ibuprofen given with crackers for pain level 7/10 in abdomen.  Ostomy with pink stoma, + stool and air . Awaiting post noel removal void, no urge to void at this time.  Instructed on IS again, weak effort and needs frequent prompts and coaching to use correctly.  Plan of care discussed, questions answered, verbalized understanding.

## 2018-06-07 NOTE — CARE PLAN
Problem: Safety  Goal: Will remain free from falls  Outcome: PROGRESSING AS EXPECTED  Patient is free from falls at this time. Patient calls appropriately for assistance. Call light within reach. Non skid socks on     Problem: Pain Management  Goal: Pain level will decrease to patient's comfort goal  Outcome: PROGRESSING AS EXPECTED  Patient declines additional pharmacologic intervention for pain at this time.

## 2018-06-08 VITALS
TEMPERATURE: 98 F | BODY MASS INDEX: 20.09 KG/M2 | SYSTOLIC BLOOD PRESSURE: 110 MMHG | RESPIRATION RATE: 16 BRPM | HEART RATE: 77 BPM | HEIGHT: 66 IN | WEIGHT: 125 LBS | OXYGEN SATURATION: 95 % | DIASTOLIC BLOOD PRESSURE: 69 MMHG

## 2018-06-08 PROBLEM — G89.18 PAIN FOLLOWING SURGERY OR PROCEDURE: Status: ACTIVE | Noted: 2018-06-08

## 2018-06-08 LAB
ANION GAP SERPL CALC-SCNC: 8 MMOL/L (ref 0–11.9)
BASOPHILS # BLD AUTO: 0.3 % (ref 0–1.8)
BASOPHILS # BLD: 0.03 K/UL (ref 0–0.12)
BUN SERPL-MCNC: 12 MG/DL (ref 8–22)
CALCIUM SERPL-MCNC: 8.8 MG/DL (ref 8.5–10.5)
CHLORIDE SERPL-SCNC: 104 MMOL/L (ref 96–112)
CO2 SERPL-SCNC: 27 MMOL/L (ref 20–33)
CREAT SERPL-MCNC: 0.68 MG/DL (ref 0.5–1.4)
EOSINOPHIL # BLD AUTO: 0.23 K/UL (ref 0–0.51)
EOSINOPHIL NFR BLD: 2.6 % (ref 0–6.9)
ERYTHROCYTE [DISTWIDTH] IN BLOOD BY AUTOMATED COUNT: 46.9 FL (ref 35.9–50)
GLUCOSE SERPL-MCNC: 99 MG/DL (ref 65–99)
HCT VFR BLD AUTO: 30.6 % (ref 37–47)
HGB BLD-MCNC: 9.8 G/DL (ref 12–16)
IMM GRANULOCYTES # BLD AUTO: 0.03 K/UL (ref 0–0.11)
IMM GRANULOCYTES NFR BLD AUTO: 0.3 % (ref 0–0.9)
LYMPHOCYTES # BLD AUTO: 1.3 K/UL (ref 1–4.8)
LYMPHOCYTES NFR BLD: 14.6 % (ref 22–41)
MCH RBC QN AUTO: 30.2 PG (ref 27–33)
MCHC RBC AUTO-ENTMCNC: 32 G/DL (ref 33.6–35)
MCV RBC AUTO: 94.2 FL (ref 81.4–97.8)
MONOCYTES # BLD AUTO: 0.62 K/UL (ref 0–0.85)
MONOCYTES NFR BLD AUTO: 7 % (ref 0–13.4)
NEUTROPHILS # BLD AUTO: 6.67 K/UL (ref 2–7.15)
NEUTROPHILS NFR BLD: 75.2 % (ref 44–72)
NRBC # BLD AUTO: 0 K/UL
NRBC BLD-RTO: 0 /100 WBC
PLATELET # BLD AUTO: 247 K/UL (ref 164–446)
PMV BLD AUTO: 10 FL (ref 9–12.9)
POTASSIUM SERPL-SCNC: 3.7 MMOL/L (ref 3.6–5.5)
RBC # BLD AUTO: 3.25 M/UL (ref 4.2–5.4)
SODIUM SERPL-SCNC: 139 MMOL/L (ref 135–145)
WBC # BLD AUTO: 8.9 K/UL (ref 4.8–10.8)

## 2018-06-08 PROCEDURE — 80048 BASIC METABOLIC PNL TOTAL CA: CPT

## 2018-06-08 PROCEDURE — 700102 HCHG RX REV CODE 250 W/ 637 OVERRIDE(OP): Performed by: NURSE PRACTITIONER

## 2018-06-08 PROCEDURE — 700111 HCHG RX REV CODE 636 W/ 250 OVERRIDE (IP): Performed by: NURSE PRACTITIONER

## 2018-06-08 PROCEDURE — 85025 COMPLETE CBC W/AUTO DIFF WBC: CPT

## 2018-06-08 PROCEDURE — A9270 NON-COVERED ITEM OR SERVICE: HCPCS | Performed by: NURSE PRACTITIONER

## 2018-06-08 PROCEDURE — 36415 COLL VENOUS BLD VENIPUNCTURE: CPT

## 2018-06-08 RX ORDER — OXYCODONE HYDROCHLORIDE 5 MG/1
2.5 TABLET ORAL
Qty: 15 TAB | Refills: 0 | Status: SHIPPED | OUTPATIENT
Start: 2018-06-08 | End: 2018-06-18

## 2018-06-08 RX ADMIN — OMEPRAZOLE 20 MG: 20 CAPSULE, DELAYED RELEASE ORAL at 08:21

## 2018-06-08 RX ADMIN — ANTACID TABLETS 500 MG: 500 TABLET, CHEWABLE ORAL at 06:39

## 2018-06-08 RX ADMIN — ACETAMINOPHEN 1000 MG: 500 TABLET, FILM COATED ORAL at 12:22

## 2018-06-08 RX ADMIN — ACETAMINOPHEN 1000 MG: 500 TABLET, FILM COATED ORAL at 06:40

## 2018-06-08 RX ADMIN — OXYCODONE HYDROCHLORIDE 2.5 MG: 5 TABLET ORAL at 12:23

## 2018-06-08 RX ADMIN — IBUPROFEN 800 MG: 800 TABLET, FILM COATED ORAL at 08:21

## 2018-06-08 RX ADMIN — IBUPROFEN 800 MG: 800 TABLET, FILM COATED ORAL at 12:22

## 2018-06-08 RX ADMIN — ONDANSETRON 4 MG: 2 INJECTION INTRAMUSCULAR; INTRAVENOUS at 08:21

## 2018-06-08 ASSESSMENT — PAIN SCALES - GENERAL: PAINLEVEL_OUTOF10: 0

## 2018-06-08 NOTE — PROGRESS NOTES
Received report from day shift RN and assumed care.   Patient is A+Ox4, pleasant and cooperative.  She is a standby assist to the bathroom.  She wears special sneakers while ambulating.  She has 2 lap stab sites, a small incision and an ostomy.  She is self caring for ostomy bag.  She is on RA.  Wound RN to meet tomorrow for final teaching for DC.  Her pain is being managed with scheduled tylenol.  No other concerns at this time.  Bed is locked and in lowest position.  Call light and belongings within reach.  Hourly rounding in place.

## 2018-06-08 NOTE — PROGRESS NOTES
"Surgical Progress Note:    POD #3 S/P ROBOTIC ASSISTED LAPAROSCOPIC PROCTOCOLECTOMY WITH END ILEOSTOMY.  Doing well. Neg N/V, + thick ostomy output, Pain controlled, Denies chest pain or SOB.   Ambulating. Tolerating PO. Is taking care of her ostomy.  Wants to go home    PE:  /61   Pulse 80   Temp 36.9 °C (98.4 °F)   Resp 17   Ht 1.676 m (5' 6\")   Wt 56.7 kg (125 lb)   SpO2 93%   Breastfeeding? No   BMI 20.18 kg/m²     I/O:   Intake/Output Summary (Last 24 hours) at 06/06/18 0631  Last data filed at 06/06/18 0400   Gross per 24 hour   Intake             1625 ml   Output             1085 ml   Net              540 ml     Review of Systems   Constitutional: Negative.  Negative for chills and fever.   Respiratory: Negative for shortness of breath.    Cardiovascular: Negative for chest pain.   Gastrointestinal: Negative for nausea and vomiting.        +flatus/+stool        Stoma pink and viable with flatus and thick stool  Genitourinary: Negative, noel out     Physical Exam   Constitutional:  appears well-developed.   Neck: Neck supple.   Cardiovascular: Normal rate.    Pulmonary/Chest: Effort normal.   Abdominal: Soft.  exhibits no distension. Appropriate tenderness.   Incisions clean, dry, and intact    Musculoskeletal: Normal range of motion.   Neurological:  alert.   Skin:  Warm and dry.   Extremities: cardenas, no edema    Labs:  Recent Labs      06/06/18 0348  06/07/18   0236  06/08/18   0423   WBC  12.4*  12.7*  8.9   RBC  3.48*  2.90*  3.25*   HEMOGLOBIN  10.5*  8.8*  9.8*   HEMATOCRIT  33.5*  27.2*  30.6*   MCV  96.3  93.8  94.2   MCH  30.2  30.3  30.2   RDW  47.4  46.7  46.9   PLATELETCT  226  199  247   MPV  9.9  10.1  10.0   NEUTSPOLYS  89.20*  79.70*  75.20*   LYMPHOCYTES  3.20*  12.90*  14.60*   MONOCYTES  6.50  6.60  7.00   EOSINOPHILS  0.00  0.20  2.60   BASOPHILS  0.70  0.20  0.30     Recent Labs      06/06/18   0348  06/07/18   0236  06/08/18   0423   SODIUM  135  132*  139   POTASSIUM  4.4  " 3.8  3.7   CHLORIDE  103  100  104   CO2  26  25  27   GLUCOSE  194*  107*  99   BUN  10  18  12         A/P:   - Regular low residue diet as tolerated.  - IS Q One hour while awake  - Ambulate.  Out of bed for all meals please  - Pain controlled.  Cont PO pain medication  - Labs stable  - DVT propholaxis, ok to cont Lovenox, sequentials and ambulate  No diagnosis of cancer was found on pathology so will not be dc'ing home on Lovenox  - Adequate urine output.  Cont, to monitor.   - Ostomy nurse to see and cont ostomy teaching, out pt referral  Will D/C home. Pt plans to stay in town 1-2 weeks prior to returning home. Discharge instructions given.  Precautions and limitations reviewed.  Pt stated understanding and agrees with this plan of care.  F/U in one week and prn.      Discussed with Patient and Dr. Elver HUGHES  [facility]  [phone]

## 2018-06-08 NOTE — DISCHARGE INSTRUCTIONS
Discharge Instructions    Discharged to home by car with relative. Discharged via wheelchair, hospital escort: Yes.  Special equipment needed: Not Applicable    Be sure to schedule a follow-up appointment with your primary care doctor or any specialists as instructed.     Discharge Plan:   Diet Plan: Discussed  Activity Level: Discussed  Confirmed Follow up Appointment: Appointment Scheduled  Confirmed Symptoms Management: Discussed  Medication Reconciliation Updated: Yes  Pneumococcal Vaccine Administered/Refused: Not given - Patient refused pneumococcal vaccine  Influenza Vaccine Indication: Patient Refuses    I understand that a diet low in cholesterol, fat, and sodium is recommended for good health. Unless I have been given specific instructions below for another diet, I accept this instruction as my diet prescription.   Other diet: low fiber    Special Instructions: None    · Is patient discharged on Warfarin / Coumadin?   No     Depression / Suicide Risk    As you are discharged from this [facility] facility, it is important to learn how to keep safe from harming yourself.    Recognize the warning signs:  · Abrupt changes in personality, positive or negative- including increase in energy   · Giving away possessions  · Change in eating patterns- significant weight changes-  positive or negative  · Change in sleeping patterns- unable to sleep or sleeping all the time   · Unwillingness or inability to communicate  · Depression  · Unusual sadness, discouragement and loneliness  · Talk of wanting to die  · Neglect of personal appearance   · Rebelliousness- reckless behavior  · Withdrawal from people/activities they love  · Confusion- inability to concentrate     If you or a loved one observes any of these behaviors or has concerns about self-harm, here's what you can do:  · Talk about it- your feelings and reasons for harming yourself  · Remove any means that you might use to hurt yourself (examples: pills,  rope, extension cords, firearm)  · Get professional help from the community (Mental Health, Substance Abuse, psychological counseling)  · Do not be alone:Call your Safe Contact- someone whom you trust who will be there for you.  · Call your local CRISIS HOTLINE 638-3393 or 621-527-2689  · Call your local Children's Mobile Crisis Response Team [place] (021) 057-7541 or www.V[employer] App  · Call the toll free National Suicide Prevention Hotlines   · National Suicide Prevention Lifeline 978-800-REBB (6011)  · National Hope Line Network 800-SUICIDE (267-5277)     DISCHARGE NURSING COMMUNICATION Start: 06/08/18 0630, CONTINUOUS, ROUTINE     Comments: Colectomy D/C instructions:     1. DIET: A low fiber diet is recommended.     The following foods are generally allowed on a low-fiber diet:     Enriched white bread or rolls without seeds   White rice, plain white pasta, noodles and macaroni   Crackers   Refined cereals such as Cream of Wheat   Pancakes or waffles made from white refined flour   Most canned or cooked fruits without skins, seeds or membranes   Fruit and vegetable juice with little or no pulp, fruit-flavored drinks and flavored ching   Canned or well-cooked vegetables without seeds, hulls or skins, such as carrots, potatoes and tomatoes   Tender meat, poultry and fish   Eggs   Tofu   Creamy peanut butter -- up to 2 tablespoons a day   Milk and foods made from milk, such as yogurt, pudding, ice cream, cheeses and sour cream -- up to 2 cups a day, including any used in cooking   Butter, margarine, oils and salad dressings without seeds   Desserts with no whole grains, seeds, nuts, raisins or coconut     You should avoid the following foods:     Whole-wheat or whole-grain breads, cereals and pasta   Brown or wild rice and other whole grains such as oats, kasha, barley, quinoa   Dried fruits and prune juice   Raw fruit, including those with seeds, skin or membranes, such as berries   Raw or undercooked  vegetables, including corn   Dried beans, peas and lentils   Seeds and nuts, and foods containing them   Coconut   Popcorn     If you're eating a low-fiber diet, a typical menu might look like this:     Breakfast:   1 glass milk   1 egg   1 slice of white toast with smooth jelly   1/2 cup canned peaches   Snack:   1 cup yogurt   Lunch:   1 to 2 cups of chicken noodle soup   Soda crackers   [place] of drained tuna with mayonnaise or salad dressing on white bread   Canned applesauce   Flavored water or iced tea     Snack:   White toast, bread or crackers   2 tablespoons creamy peanut butter   Flavored water     Dinner:   3 ounces lean meat, poultry or fish   1/2 cup white rice   1/2 cup cooked vegetables, such as carrots or green beans   1 enriched white dinner roll with butter   Hot tea     Prepare all foods so that they're tender. Good cooking methods include simmering, poaching, stewing, steaming and braising. Baking or microwaving in a covered dish is another option. Try to avoid roasting, broiling and grilling -- methods that tend to make foods dry and tough. You may also want to avoid fried foods and go easy on spices.   Keep in mind that you may have fewer bowel movements and smaller stools while you're following a low-fiber diet. To avoid constipation, you may need to drink extra fluids. Drink plenty of water unless your doctor tells you otherwise, and use juices and milk as noted.     2. ACTIVITIES: After discharge from the hospital, you may resume full routine activities as you feel up to them.  You have a 10 pound weight restriction for two weeks after surgery. This means no lifting anything heavier than a gallon of milk. Routine activities such as bathing, walking, going up and down stairs, and driving* (see below) are safe.     3. DRIVING: You may drive whenever you are off pain medications and are able to perform the activities needed to drive, i.e. turning, bending, twisting, etc.     4. BATHING: no  restrictions, unless you have a drain in place, in which case, showering is okay, but no baths or pools until after your first postoperative appointment.     5. PAIN MEDICATION: You will be given a prescription for pain medication at discharge. Please take these as directed. It is important to remember not to take medications on an empty stomach as this may cause nausea.     6. BOWEL FUNCTION: A few patients, after this operation, will develop either frequent or loose stools after meals. This usually corrects itself after a few days, to a few months.     7. APPOINTMENT: Contact our office at [phone] for a follow-up appointment in 1-2 weeks following your procedure.     8. CALL IF YOU HAVE: (1) Fevers to more than 101F, (2) Unusual chest or leg pain, (3) Drainage or fluid from incision that may be foul smelling, increased tenderness or soreness at the wound or the wound edges are no longer together, redness or swelling at the incision site. Please do not hesitate to call with any other questions.       If you have any additional questions, please do not hesitate to call the office and speak to either myself or the physician on call.     Yehuda Raya MD PhD   [facility]   Colon and Rectal Surgeon   [phone]

## 2018-06-08 NOTE — WOUND TEAM
"[facility]  Inpatient Services  New Ostomy Management & Teaching    HPI:  Reviewed  PMH: Reviewed   SH: Reviewed    Subjective:  \"I'm ready to go home.\"      Objective:  In bed.        Ostomy type:  End ileostomy    Stoma location:  RUQ   Stoma assessment:    Appearance:  Red, edematous     Size:  1\"    Protrusion:  Budded     Output:  Min, brown mush      MC jxn  Intact     Peristomal skin:  Intact      Ostomy Appliance (type and size):  2 piece 2 1/4 with paste ring.  Continued with fomaflex moldable barrier.     Interventions:  Family observed and asked questions throughout.  Patient removed Barrier.  Assisted patient in cleansing Izzy-stomal skin with warm wash cloth.  Patient removed plastic from moldable barrier, applied paste ring.  Assisted patient to apply to skin.  Patient attached and closed pouch.     Pt education: Questions and concerns addressed with patient and family at bedside.  Went over supplies.  Supplies given for DC.    Evaluation:  Patient with  and daughter at bedside.  Family states they will be assisting patient with care.  Very engaged and involved.         Plan: Ostomy nurses to continue to follow for ostomy needs and teaching.  Plan to DC today.         Anticipated discharge needs: Supplies, supplier information, outpatient ostomy clinic   "

## 2018-06-08 NOTE — DISCHARGE SUMMARY
Discharge Summary    CHIEF COMPLAINT ON ADMISSION  COLON MASS AND ULCERATIVE COLITIS     Reason for Admission  COLON MASS AND ULCERATIVE COLITIS     Admission Date  6/5/2018    CODE STATUS  Full Code    HPI & HOSPITAL COURSE  This is a 73 y.o. female here with COLON MASS AND ULCERATIVE COLITIS.  The patient post operative coarse was essentially unremarkable.  At the time of discharge she was afebrile, tolerating a regular diet and voiding normally.  Her general exam is unremarkable.  Her abdominal incisions are healing satisfactorily.  Patient has been counseled on normal expectations of an uncomplicated post operative coarse.  She was discharged on a regular diet.  She has restarted her pre-admission medications.  Her discharge medications are as below.   She is to follow up with Dr. Raya in one to two weeks and prn.  Discharge instructions were given. Precautions and limitations were reviewed. The patient was counseled regarding the benefits of narcotics for post-operative pain in the short term period. The patient was made aware of the alternatives, including heating pads, ice, and NSAID medication.    The risks of addiction, overdose, and respiratory depression, were discussed.  We discussed taking the medications as prescribed. We discussed other medications the patient is taking, and how these can interact. The patient was notified not to share the medications with others. We discussed warning signs of addiction.    I reviewed the NarcRx  program, and the patient deemed not to be at high risk for abuse, and had no concurrent prescriptions.    The patient understands that these medications are intended to be used in the short term for post-operative pain only.    The patient was given a chance to ask questions, and all her questions were answered. Discussion was undertaken with Layman's terms. The patient demonstrated adequate understanding. Consent was given in clear state of mind and consent was signed  preoperatively.   The patient has stated understanding and agrees with this plan of care.                            Pathological exam:    A. Terminal ileum, colon, and rectum:         Total colectomy demonstrating two tubular adenomas with no high          grade dysplasia noted, one present on the distal margin.  Also          appreciated are numerous diverticuli, some of which are          associated with chronic inflammation and abscess formation is          noted at area of stricture.  No histologic features suggestive          for active inflammatory bowel disease (ulcerative colitis)          noted.         Five lymph nodes negative for metastatic carcinoma identified          (0/5).         No malignancy identified.  B. Omentum:         Benign mature adipose tissue with no metastatic carcinoma          identified.       Therefore, she is discharged in good and stable condition to home with close outpatient follow-up.        Discharge Date  6/8/2018    FOLLOW UP ITEMS POST DISCHARGE  Ostomy supplies    DISCHARGE DIAGNOSES  Active Problems:    Pain following surgery or procedure POA: No  Resolved Problems:    * No resolved hospital problems. *      FOLLOW UP    Yehuda Raya M.D.  [address]  [address]  [facility] [address]  [phone]      1-2 weeks and prn      MEDICATIONS ON DISCHARGE     Medication List      START taking these medications      Instructions   oxyCODONE immediate-release 5 MG Tabs  Commonly known as:  ROXICODONE   Take 0.5 Tabs by mouth every 3 hours as needed for up to 10 days.  Dose:  2.5 mg        CONTINUE taking these medications      Instructions   alendronate 70 MG Tabs  Commonly known as:  FOSAMAX   Take 70 mg by mouth every Monday.  Dose:  70 mg     ALIGN PO   Take 1 Dose by mouth every day.  Dose:  1 Dose     mesalamine 1.2 GM Tbec  Commonly known as:  LIALDA   Take 2.4 g by mouth every day.  Dose:  2.4 g     omeprazole 20 MG delayed-release capsule  Commonly known as:   "PRILOSEC   Take 20 mg by mouth every day.  Dose:  20 mg     VIACTIV MULTI-VITAMIN Chew   Take 1 Tab by mouth every day.  Dose:  1 Tab        STOP taking these medications    CLARITIN 10 MG Tabs  Generic drug:  loratadine     loperamide 2 MG Caps  Commonly known as:  IMODIUM     nitrofurantoin macro crystals 100 MG Caps  Commonly known as:  MACRODANTIN     polyethylene glycol/lytes Pack  Commonly known as:  MIRALAX            Allergies  Allergies   Allergen Reactions   • Bee Swelling   • Eggs Diarrhea, Vomiting and Nausea     \"Diarrhea, Vomiting & Nausea\"  \"Scrambled eggs\"   Patient is okay to have foods containing egg ingredients    • Influenza Vaccines      Reaction-\"Couldn't feel anything from my waist down\"   • Pcn [Penicillins] Rash     Rash - many years ago      • Shellfish Allergy Unspecified     Pt has never had shellfish and does not know if she is allergic       DIET  Orders Placed This Encounter   Procedures   • Diet Order     Standing Status:   Standing     Number of Occurrences:   1     Order Specific Question:   Diet:     Answer:   Low Fiber(GI Soft) [2]     Order Specific Question:   Miscellaneous modifications:     Answer:   6 Small Meals [4]       ACTIVITY  As tolerated.  Weight bearing as tolerated    CONSULTATIONS  Outpatient ostomy clinic    PROCEDURES  1)  ROBOTIC ASSISTED LAPAROSCOPIC PROCTOCOLECTOMY WITH END ILEOSTOMY  2) omentectomy        LABORATORY  Lab Results   Component Value Date    SODIUM 139 06/08/2018    POTASSIUM 3.7 06/08/2018    CHLORIDE 104 06/08/2018    CO2 27 06/08/2018    GLUCOSE 99 06/08/2018    BUN 12 06/08/2018    CREATININE 0.68 06/08/2018        Lab Results   Component Value Date    WBC 8.9 06/08/2018    HEMOGLOBIN 9.8 (L) 06/08/2018    HEMATOCRIT 30.6 (L) 06/08/2018    PLATELETCT 247 06/08/2018        Total time of the discharge process exceeds 40 minutes.  "

## 2018-06-08 NOTE — CARE PLAN
Problem: Safety  Goal: Will remain free from falls  Outcome: PROGRESSING AS EXPECTED  Patient calls for assistance to the bathroom d/t plantar fasciitis    Problem: Knowledge Deficit  Goal: Knowledge of disease process/condition, treatment plan, diagnostic tests, and medications will improve  Outcome: PROGRESSING AS EXPECTED  Patient with self care to ostomy bag

## 2018-06-08 NOTE — PROGRESS NOTES
6845 Received report, introduced self to pt, reviewed labs, orders, allergies, code status    0900 DC delayed due to ostomy nurse scheduled to see     1300 DC education complete, pt verbalized understanding. Pt has ostomy supplies, self care with ostomy. Incisions on abdomen approximated and intact.   here to drive her home. Scripts in hand. Pt then wheeled off unit by Marisa FONSECA. No complications with DC

## 2018-06-09 ENCOUNTER — PATIENT OUTREACH (OUTPATIENT)
Dept: HEALTH INFORMATION MANAGEMENT | Facility: OTHER | Age: 73
End: 2018-06-09

## 2018-06-09 NOTE — PROGRESS NOTES
06/09/2018 0846 - Discharge Outreach - received VM from daughter of patient. Returned call on number that was left and LM. Tried calling other number for daughter - reached daughter, Calli. She stated she had not called. May have been daughter in law. She will call me back if needed.

## 2018-06-10 ENCOUNTER — HOSPITAL ENCOUNTER (EMERGENCY)
Facility: MEDICAL CENTER | Age: 73
End: 2018-06-10
Attending: EMERGENCY MEDICINE
Payer: MEDICARE

## 2018-06-10 VITALS
TEMPERATURE: 96.8 F | RESPIRATION RATE: 16 BRPM | HEART RATE: 73 BPM | WEIGHT: 125 LBS | SYSTOLIC BLOOD PRESSURE: 138 MMHG | DIASTOLIC BLOOD PRESSURE: 60 MMHG | OXYGEN SATURATION: 93 % | BODY MASS INDEX: 20.09 KG/M2 | HEIGHT: 66 IN

## 2018-06-10 DIAGNOSIS — E86.0 DEHYDRATION: ICD-10-CM

## 2018-06-10 DIAGNOSIS — R11.2 NON-INTRACTABLE VOMITING WITH NAUSEA, UNSPECIFIED VOMITING TYPE: ICD-10-CM

## 2018-06-10 LAB
ALBUMIN SERPL BCP-MCNC: 3.7 G/DL (ref 3.2–4.9)
ALBUMIN/GLOB SERPL: 1.2 G/DL
ALP SERPL-CCNC: 75 U/L (ref 30–99)
ALT SERPL-CCNC: 81 U/L (ref 2–50)
ANION GAP SERPL CALC-SCNC: 11 MMOL/L (ref 0–11.9)
APPEARANCE UR: CLEAR
AST SERPL-CCNC: 97 U/L (ref 12–45)
BACTERIA #/AREA URNS HPF: NEGATIVE /HPF
BASOPHILS # BLD AUTO: 0.3 % (ref 0–1.8)
BASOPHILS # BLD: 0.04 K/UL (ref 0–0.12)
BILIRUB SERPL-MCNC: 1.2 MG/DL (ref 0.1–1.5)
BILIRUB UR QL STRIP.AUTO: NEGATIVE
BUN SERPL-MCNC: 19 MG/DL (ref 8–22)
CALCIUM SERPL-MCNC: 9.6 MG/DL (ref 8.5–10.5)
CHLORIDE SERPL-SCNC: 90 MMOL/L (ref 96–112)
CO2 SERPL-SCNC: 31 MMOL/L (ref 20–33)
COLOR UR: YELLOW
CREAT SERPL-MCNC: 0.63 MG/DL (ref 0.5–1.4)
EOSINOPHIL # BLD AUTO: 0.3 K/UL (ref 0–0.51)
EOSINOPHIL NFR BLD: 2.5 % (ref 0–6.9)
EPI CELLS #/AREA URNS HPF: NEGATIVE /HPF
ERYTHROCYTE [DISTWIDTH] IN BLOOD BY AUTOMATED COUNT: 44.7 FL (ref 35.9–50)
GLOBULIN SER CALC-MCNC: 3.1 G/DL (ref 1.9–3.5)
GLUCOSE SERPL-MCNC: 108 MG/DL (ref 65–99)
GLUCOSE UR STRIP.AUTO-MCNC: NEGATIVE MG/DL
HCT VFR BLD AUTO: 36.1 % (ref 37–47)
HGB BLD-MCNC: 11.9 G/DL (ref 12–16)
HYALINE CASTS #/AREA URNS LPF: ABNORMAL /LPF
IMM GRANULOCYTES # BLD AUTO: 0.05 K/UL (ref 0–0.11)
IMM GRANULOCYTES NFR BLD AUTO: 0.4 % (ref 0–0.9)
KETONES UR STRIP.AUTO-MCNC: 40 MG/DL
LEUKOCYTE ESTERASE UR QL STRIP.AUTO: NEGATIVE
LIPASE SERPL-CCNC: 15 U/L (ref 11–82)
LYMPHOCYTES # BLD AUTO: 1.17 K/UL (ref 1–4.8)
LYMPHOCYTES NFR BLD: 9.7 % (ref 22–41)
MCH RBC QN AUTO: 30.7 PG (ref 27–33)
MCHC RBC AUTO-ENTMCNC: 33.8 G/DL (ref 33.6–35)
MCV RBC AUTO: 90.9 FL (ref 81.4–97.8)
MICRO URNS: ABNORMAL
MONOCYTES # BLD AUTO: 0.96 K/UL (ref 0–0.85)
MONOCYTES NFR BLD AUTO: 8 % (ref 0–13.4)
NEUTROPHILS # BLD AUTO: 9.51 K/UL (ref 2–7.15)
NEUTROPHILS NFR BLD: 79.1 % (ref 44–72)
NITRITE UR QL STRIP.AUTO: NEGATIVE
NRBC # BLD AUTO: 0 K/UL
NRBC BLD-RTO: 0 /100 WBC
PH UR STRIP.AUTO: 5.5 [PH]
PLATELET # BLD AUTO: 371 K/UL (ref 164–446)
PMV BLD AUTO: 9.3 FL (ref 9–12.9)
POTASSIUM SERPL-SCNC: 3.7 MMOL/L (ref 3.6–5.5)
PROT SERPL-MCNC: 6.8 G/DL (ref 6–8.2)
PROT UR QL STRIP: NEGATIVE MG/DL
RBC # BLD AUTO: 3.94 M/UL (ref 4.2–5.4)
RBC # URNS HPF: ABNORMAL /HPF
RBC UR QL AUTO: ABNORMAL
SODIUM SERPL-SCNC: 132 MMOL/L (ref 135–145)
SP GR UR STRIP.AUTO: 1.01
UROBILINOGEN UR STRIP.AUTO-MCNC: 0.2 MG/DL
WBC # BLD AUTO: 12 K/UL (ref 4.8–10.8)
WBC #/AREA URNS HPF: ABNORMAL /HPF

## 2018-06-10 PROCEDURE — A9270 NON-COVERED ITEM OR SERVICE: HCPCS | Performed by: EMERGENCY MEDICINE

## 2018-06-10 PROCEDURE — 81001 URINALYSIS AUTO W/SCOPE: CPT

## 2018-06-10 PROCEDURE — 700105 HCHG RX REV CODE 258: Performed by: EMERGENCY MEDICINE

## 2018-06-10 PROCEDURE — 700111 HCHG RX REV CODE 636 W/ 250 OVERRIDE (IP): Performed by: EMERGENCY MEDICINE

## 2018-06-10 PROCEDURE — 83690 ASSAY OF LIPASE: CPT

## 2018-06-10 PROCEDURE — 85025 COMPLETE CBC W/AUTO DIFF WBC: CPT

## 2018-06-10 PROCEDURE — 96360 HYDRATION IV INFUSION INIT: CPT

## 2018-06-10 PROCEDURE — 99284 EMERGENCY DEPT VISIT MOD MDM: CPT

## 2018-06-10 PROCEDURE — 80053 COMPREHEN METABOLIC PANEL: CPT

## 2018-06-10 PROCEDURE — 700102 HCHG RX REV CODE 250 W/ 637 OVERRIDE(OP): Performed by: EMERGENCY MEDICINE

## 2018-06-10 RX ORDER — ONDANSETRON 4 MG/1
4 TABLET, ORALLY DISINTEGRATING ORAL EVERY 8 HOURS PRN
Qty: 10 TAB | Refills: 2 | Status: SHIPPED | OUTPATIENT
Start: 2018-06-10

## 2018-06-10 RX ORDER — OMEPRAZOLE 20 MG/1
20 CAPSULE, DELAYED RELEASE ORAL ONCE
Status: COMPLETED | OUTPATIENT
Start: 2018-06-10 | End: 2018-06-10

## 2018-06-10 RX ORDER — ONDANSETRON 4 MG/1
4 TABLET, ORALLY DISINTEGRATING ORAL ONCE
Status: COMPLETED | OUTPATIENT
Start: 2018-06-10 | End: 2018-06-10

## 2018-06-10 RX ORDER — SODIUM CHLORIDE 9 MG/ML
1000 INJECTION, SOLUTION INTRAVENOUS ONCE
Status: COMPLETED | OUTPATIENT
Start: 2018-06-10 | End: 2018-06-10

## 2018-06-10 RX ADMIN — ONDANSETRON 4 MG: 4 TABLET, ORALLY DISINTEGRATING ORAL at 09:15

## 2018-06-10 RX ADMIN — SODIUM CHLORIDE 1000 ML: 9 INJECTION, SOLUTION INTRAVENOUS at 09:15

## 2018-06-10 RX ADMIN — OMEPRAZOLE 20 MG: 20 CAPSULE, DELAYED RELEASE ORAL at 11:49

## 2018-06-10 ASSESSMENT — PAIN SCALES - GENERAL: PAINLEVEL_OUTOF10: 0

## 2018-06-10 NOTE — ED PROVIDER NOTES
"ED Provider Note    CHIEF COMPLAINT  Chief Complaint   Patient presents with   • Post-Op Complications   • N/V   • Abdominal Pain       HPI  Melba Olguin is a 73 y.o. female who presents day 5 after a colectomy and ostomy placement secondary to ulcerative colitis.  Pathology has been reported as negative for cancer.  At home, patient has been intermittently vomiting, sometimes within an hour of eating, other ×7 hours later.  Family was concerned about possible dehydration.  She has had normal ostomy output.  Onset of symptoms was after leaving the hospital.  Family notes that the patient is not taking nausea medication but had been receiving this medication while in hospital.  No fever.  No shortness of breath.  No chest pain.  The patient does have history of urinary tract infection although denies urinary symptoms at this time    REVIEW OF SYSTEMS  Constitutional: Gen. malaise, no fever  Respiratory: No shortness of breath  Cardiac: No chest pain or syncope  Gastrointestinal: Patient states abdominal pain following the surgery has been improving daily.  Episodes of vomiting  Musculoskeletal: No acute neck or back pain  Neurologic: No headache  Genitourinary: Recent bladder infection       All other systems are negative.     PAST MEDICAL HISTORY  Past Medical History:   Diagnosis Date   • Acid reflux 06/01/2018   • Arthritis 06/01/2018   • Asthma 06/01/2018    Uses Inhaler PRN   • Bladder infection 06/01/2018    Pt. currently being treated at this time & states Dr. Raya is aware of this   • Bowel habit changes 06/01/2018    \"Constipation/Diarrhea\"   • Breath shortness 06/01/2018    \"From Asthma & Allergies Sometimes\"   • Egg allergy 06/01/2018   • Falls     H/O   • Heart burn 06/01/2018   • Indigestion 06/01/2018   • Pain 06/01/2018    \"Feet\"   • Plantar fasciitis 06/01/2018   • Sensitive skin 06/01/2018   • UC [facility]ulcerative colitis) ([facility])    • Urinary bladder disorder 06/01/2018    \"Bladder infection & DrRachel " "Elver is aware of this\"   • Urinary incontinence 06/01/2018       FAMILY HISTORY  No family history on file.    SOCIAL HISTORY  Social History     Social History   • Marital status:      Spouse name: N/A   • Number of children: N/A   • Years of education: N/A     Social History Main Topics   • Smoking status: Never Smoker   • Smokeless tobacco: Never Used   • Alcohol use Yes      Comment: 1/day   • Drug use: No   • Sexual activity: Not on file     Other Topics Concern   • Not on file     Social History Narrative   • No narrative on file       SURGICAL HISTORY  Past Surgical History:   Procedure Laterality Date   • LOW ANTERIOR RESECTION ROBOTIC XI  6/5/2018    Procedure: robotic assisted laparoscopic proctocolectomy with end ileostomy and omentectomy;  Surgeon: Yehuda Raya M.D.;  Location: SURGERY [facility];  Service: General   • GYN SURGERY  2001    Hysterectomy   • TUBAL LIGATION  1972   • APPENDECTOMY     • LUMPECTOMY      1972       CURRENT MEDICATIONS  Home Medications    **Home medications have not yet been reviewed for this encounter**         ALLERGIES  Allergies   Allergen Reactions   • Bee Swelling   • Eggs Diarrhea, Vomiting and Nausea     \"Diarrhea, Vomiting & Nausea\"  \"Scrambled eggs\"   Patient is okay to have foods containing egg ingredients    • Influenza Vaccines      Reaction-\"Couldn't feel anything from my waist down\"   • Pcn [Penicillins] Rash     Rash - many years ago      • Shellfish Allergy Unspecified     Pt has never had shellfish and does not know if she is allergic       PHYSICAL EXAM  VITAL SIGNS: /60   Pulse 73   Temp 36 °C (96.8 °F)   Resp 16   Ht 1.676 m (5' 6\")   Wt 56.7 kg (125 lb)   SpO2 93%   BMI 20.18 kg/m²   Constitutional: Nontoxic appearance  ENT: Nares clear, mucous membranes mostly dry.  Eyes:  Conjunctiva normal, No discharge.    Lymphatic: No adenopathy.   Cardiovascular: Normal heart rate, Normal rhythm.   Pulmonary: No wheezing, no " rales  Gastrointestinal: Tenderness adjacent to ostomy.  No peritoneal signs.  Abdomen is soft  Skin: Warm, Dry, No erythema, No evidence of wound infection.   Musculoskeletal:  No CVA tenderness.   Neurologic: Alert & cooperative, Normal motor and sensory function, No focal deficits noted.   Psychiatric:Normal affect, Normal mood.    RADIOLOGY/PROCEDURES/Labs  Results for orders placed or performed during the hospital encounter of 06/10/18   CBC WITH DIFFERENTIAL   Result Value Ref Range    WBC 12.0 (H) 4.8 - 10.8 K/uL    RBC 3.94 (L) 4.20 - 5.40 M/uL    Hemoglobin 11.9 (L) 12.0 - 16.0 g/dL    Hematocrit 36.1 (L) 37.0 - 47.0 %    MCV 90.9 81.4 - 97.8 fL    MCH 30.7 27.0 - 33.0 pg    MCHC 33.8 33.6 - 35.0 g/dL    RDW 44.7 35.9 - 50.0 fL    Platelet Count 371 164 - 446 K/uL    MPV 9.3 9.0 - 12.9 fL    Neutrophils-Polys 79.10 (H) 44.00 - 72.00 %    Lymphocytes 9.70 (L) 22.00 - 41.00 %    Monocytes 8.00 0.00 - 13.40 %    Eosinophils 2.50 0.00 - 6.90 %    Basophils 0.30 0.00 - 1.80 %    Immature Granulocytes 0.40 0.00 - 0.90 %    Nucleated RBC 0.00 /100 WBC    Neutrophils (Absolute) 9.51 (H) 2.00 - 7.15 K/uL    Lymphs (Absolute) 1.17 1.00 - 4.80 K/uL    Monos (Absolute) 0.96 (H) 0.00 - 0.85 K/uL    Eos (Absolute) 0.30 0.00 - 0.51 K/uL    Baso (Absolute) 0.04 0.00 - 0.12 K/uL    Immature Granulocytes (abs) 0.05 0.00 - 0.11 K/uL    NRBC (Absolute) 0.00 K/uL   COMP METABOLIC PANEL   Result Value Ref Range    Sodium 132 (L) 135 - 145 mmol/L    Potassium 3.7 3.6 - 5.5 mmol/L    Chloride 90 (L) 96 - 112 mmol/L    Co2 31 20 - 33 mmol/L    Anion Gap 11.0 0.0 - 11.9    Glucose 108 (H) 65 - 99 mg/dL    Bun 19 8 - 22 mg/dL    Creatinine 0.63 0.50 - 1.40 mg/dL    Calcium 9.6 8.5 - 10.5 mg/dL    AST(SGOT) 97 (H) 12 - 45 U/L    ALT(SGPT) 81 (H) 2 - 50 U/L    Alkaline Phosphatase 75 30 - 99 U/L    Total Bilirubin 1.2 0.1 - 1.5 mg/dL    Albumin 3.7 3.2 - 4.9 g/dL    Total Protein 6.8 6.0 - 8.2 g/dL    Globulin 3.1 1.9 - 3.5 g/dL    A-G  Ratio 1.2 g/dL   URINALYSIS,CULTURE IF INDICATED   Result Value Ref Range    Color Yellow     Character Clear     Specific Gravity 1.006 <1.035    Ph 5.5 5.0 - 8.0    Glucose Negative Negative mg/dL    Ketones 40 (A) Negative mg/dL    Protein Negative Negative mg/dL    Bilirubin Negative Negative    Urobilinogen, Urine 0.2 Negative    Nitrite Negative Negative    Leukocyte Esterase Negative Negative    Occult Blood Trace (A) Negative    Micro Urine Req Microscopic    LIPASE   Result Value Ref Range    Lipase 15 11 - 82 U/L   ESTIMATED GFR   Result Value Ref Range    GFR If African American >60 >60 mL/min/1.73 m 2    GFR If Non African American >60 >60 mL/min/1.73 m 2   URINE MICROSCOPIC (W/UA)   Result Value Ref Range    WBC 0-2 /hpf    RBC 2-5 (A) /hpf    Bacteria Negative None /hpf    Epithelial Cells Negative /hpf    Hyaline Cast 0-2 /lpf         COURSE & MEDICAL DECISION MAKING  Pertinent Labs & Imaging studies reviewed. (See chart for details)  Patient has leukocytosis of 12, in comparison with previous labs approximately the same.  There was no fever and no evidence of worsening pain.  Urinalysis negative for infection.  Hemoglobin is improving.  Patient was given IV fluids, 1 L normal saline for evidence of dehydration, she felt improved following this fluid.  Patient was given Zofran for nausea, later demonstrating the ability to drink water without vomiting.  They're given a prescription for Zofran.  The family feels comfortable at this point taking her home and all have expressed willingness to return if worse.  They're advised follow-up with surgeon as scheduled.  Patient was discharged in improved condition    FINAL IMPRESSION  1. Non-intractable vomiting with nausea, unspecified vomiting type    2. Dehydration            Electronically signed by: Elver Kirby, 6/10/2018 3:01 PM

## 2018-06-10 NOTE — ED TRIAGE NOTES
Pt d/c'd from hospital on Friday after ileostomy placed for ulcerative colitis. Pt c/o increased nausea and vomiting. Per pt stool is coming out of ostomy. Pt also c/o chills.

## 2018-06-10 NOTE — DISCHARGE INSTRUCTIONS
Nausea, Adult  Nausea is the feeling of an upset stomach or having to vomit. Nausea on its own is not usually a serious concern, but it may be an early sign of a more serious medical problem. As nausea gets worse, it can lead to vomiting. If vomiting develops, or if you are not able to drink enough fluids, you are at risk of becoming dehydrated. Dehydration can make you tired and thirsty, cause you to have a dry mouth, and decrease how often you urinate. Older adults and people with other diseases or a weak immune system are at higher risk for dehydration. The main goals of treating your nausea are:  · To limit repeated nausea episodes.  · To prevent vomiting and dehydration.  Follow these instructions at home:  Follow instructions from your health care provider about how to care for yourself at home.  Eating and drinking  Follow these recommendations as told by your health care provider:  · Take an oral rehydration solution (ORS). This is a drink that is sold at pharmacies and retail stores.  · Drink clear fluids in small amounts as you are able. Clear fluids include water, ice chips, diluted fruit juice, and low-calorie sports drinks.  · Eat bland, easy-to-digest foods in small amounts as you are able. These foods include bananas, applesauce, rice, lean meats, toast, and crackers.  · Avoid drinking fluids that contain a lot of sugar or caffeine, such as energy drinks, sports drinks, and soda.  · Avoid alcohol.  · Avoid spicy or fatty foods.  General instructions  · Drink enough fluid to keep your urine clear or pale yellow.  · Wash your hands often. If soap and water are not available, use hand .  · Make sure that all people in your household wash their hands well and often.  · Rest at home while you recover.  · Take over-the-counter and prescription medicines only as told by your health care provider.  · Breathe slowly and deeply when you feel nauseous.  · Watch your condition for any changes.  · Keep  all follow-up visits as told by your health care provider. This is important.  Contact a health care provider if:  · You have a headache.  · You have new symptoms.  · Your nausea gets worse.  · You have a fever.  · You feel light-headed or dizzy.  · You vomit.  · You cannot keep fluids down.  Get help right away if:  · You have pain in your chest, neck, arm, or jaw.  · You feel extremely weak or you faint.  · You have vomit that is bright red or looks like coffee grounds.  · You have bloody or black stools or stools that look like tar.  · You have a severe headache, a stiff neck, or both.  · You have severe pain, cramping, or bloating in your abdomen.  · You have a rash.  · You have difficulty breathing or are breathing very quickly.  · Your heart is beating very quickly.  · Your skin feels cold and clammy.  · You feel confused.  · You have pain when you urinate.  · You have signs of dehydration, such as:  ¨ Dark urine, very little, or no urine.  ¨ Cracked lips.  ¨ Dry mouth.  ¨ Sunken eyes.  ¨ Sleepiness.  ¨ Weakness.  These symptoms may represent a serious problem that is an emergency. Do not wait to see if the symptoms will go away. Get medical help right away. Call your local emergency services (911 in the [place] Do not drive yourself to the hospital.   This information is not intended to replace advice given to you by your health care provider. Make sure you discuss any questions you have with your health care provider.  Document Released: 01/25/2006 Document Revised: 05/22/2017 Document Reviewed: 08/23/2016  Elsevier Interactive Patient Education © 2017 [employer]

## 2018-06-10 NOTE — ED NOTES
Patient provided d/c paperwork, wheel chair to [facility], 1 rx for zofran. Verbalized understanding of follow up care. No questions.

## 2018-06-27 ENCOUNTER — TELEPHONE (OUTPATIENT)
Dept: WOUND CARE | Facility: MEDICAL CENTER | Age: 73
End: 2018-06-27

## 2018-06-27 NOTE — TELEPHONE ENCOUNTER
Received TC from pt's , Bari,  yesterday re concerns about redness around pt's ileostomy.  They live in [place] and are not able to get into [facility] very easily. They have an appt in the wound clinic on 7/23. The have a private duty CNA helping them with bathing and her ostomy.  Pt's  described the condition of the skin and informed me that she has been having leakage.  I asked that he email me a picture to my [facility] email address next time they changed the ostomy appliance.      Photo received via email today from their daughter's email.  Denudation and what appears to be pseudoverrucous lesions noted from 10-2:00 and from 6 to 9:00.  Received TC from pt's daughter, Zofia.  Informed that pt's ostomy appliance was changed today by Marysol, a CNA who is helping them.  Discussed my recommendations with daughter, and I asked that Marysol call me to discuss what she is currently doing with pt's ostomy.     Spoke with Marysol.  Marysol stated that she is cleaning the skin with Dial soap and then applying the barrier and pouch.    My recommendations: (Sent via email back to pt's daughter)    1. Cleanse skin around stoma with just plain warm water or with a no-rinse soap    2. Dampen a clean 4x4 gauze with 50% white vinegar; 50%water solution.  Lay the dampened 4x4s over the red areas of the skin for about 10-15 minutes    3. Cleanse skin again with warm water, pat dry or allow to air dry    4.  Sprinkle ostomy powder on the redenned areas of the skin, use a dry 4x4 gauze to brush away most of it.  (Powder will stick to moist areas of the skin)    5.  Use a No-Sting skin barrier wipe to blot the powder over the reddened areas    6. Stretch a paste ring to fit around the stoma.  Place paste ring around stoma    7. Cut barrier to fit stoma, apply barrier    8. Apply pouch to barrier    TIPS   1. Empty pouch whenever it is  1/3 to 1/2 full.  Do not allow to get too full   2. Change the appliance every 3-4 days, or  immediately if it leaks   3. Do not eat or drink anything 2-3 hours before you are planning to change your pouch   4. You may shower with the ostomy appliance off.  Ok to use soap around the stoma in the shower, just be sure to rinse thoroughly.      Please try to get into the clinic sooner if possible.           Spoke with pt's , Bari, via phone.  Informed him that I had spoken with his daughter and with Marysol, and that I had sent written instructions via email.  I asked him again if he could get her into the clinic sooner.  He states that he will let me know next week.  I informed him that I will be out of the office next week, but that I had sent contact info for my colleague, Moy Michael.

## 2018-07-23 ENCOUNTER — NON-PROVIDER VISIT (OUTPATIENT)
Dept: WOUND CARE | Facility: MEDICAL CENTER | Age: 73
End: 2018-07-23
Attending: SURGERY
Payer: MEDICARE

## 2018-07-23 PROCEDURE — 99213 OFFICE O/P EST LOW 20 MIN: CPT

## 2018-07-23 NOTE — CERTIFICATION
"Advanced Wound Care  [facility]  [address]  [place] [address]  [phone] Fax#    Initial Ostomy Evaluation  For 90 Day Certification Period:  7/23/2018 - 10/23/2018    Referring Provider:  Dr. Yehuda Raya  Primary Provider:  Consulting Providers:  Surgeon:Dr. Yehuda Raya      Start of Care:7/23/2018  Reason for referral:new ileostomy      SUBJECTIVE:    HPI:    Past Medical Hx:  Past Medical History:   Diagnosis Date   • Acid reflux 06/01/2018   • Arthritis 06/01/2018   • Asthma 06/01/2018    Uses Inhaler PRN   • Bladder infection 06/01/2018    Pt. currently being treated at this time & states Dr. Raya is aware of this   • Bowel habit changes 06/01/2018    \"Constipation/Diarrhea\"   • Breath shortness 06/01/2018    \"From Asthma & Allergies Sometimes\"   • Egg allergy 06/01/2018   • Falls     H/O   • Heart burn 06/01/2018   • Indigestion 06/01/2018   • Pain 06/01/2018    \"Feet\"   • Plantar fasciitis 06/01/2018   • Sensitive skin 06/01/2018   • UC (ulcerative colitis) ([facility])    • Urinary bladder disorder 06/01/2018    \"Bladder infection & Dr. Raya is aware of this\"   • Urinary incontinence 06/01/2018   Surgical Hx:  Past Surgical History:   Procedure Laterality Date   • LOW ANTERIOR RESECTION ROBOTIC XI  6/5/2018    Procedure: robotic assisted laparoscopic proctocolectomy with end ileostomy and omentectomy;  Surgeon: Yehuda Raya M.D.;  Location: SURGERY [facility];  Service: General   • GYN SURGERY  2001    Hysterectomy   • TUBAL LIGATION  1972   • APPENDECTOMY     • LUMPECTOMY      1972     Medications:  Current Outpatient Prescriptions:   •  ondansetron (ZOFRAN ODT) 4 MG TABLET DISPERSIBLE, Take 1 Tab by mouth every 8 hours as needed., Disp: 10 Tab, Rfl: 2  •  Probiotic Product (ALIGN PO), Take 1 Dose by mouth every day., Disp: , Rfl:   •  omeprazole (PRILOSEC) 20 MG delayed-release capsule, Take 20 mg by mouth every day., Disp: , Rfl:   •  " Multiple Vitamins-Calcium (VIACTIV MULTI-VITAMIN) Chew Tab, Take 1 Tab by mouth every day., Disp: , Rfl:   •  mesalamine (LIALDA) 1.2 GM Tablet Delayed Response, Take 2.4 g by mouth every day., Disp: , Rfl:   •  alendronate (FOSAMAX) 70 MG Tab, Take 70 mg by mouth every Monday., Disp: , Rfl:   Allergies:Bee; Eggs; Influenza vaccines; Pcn [penicillins]; and Shellfish allergy  Social Hx:  Social History     Social History   • Marital status:      Spouse name: N/A   • Number of children: N/A   • Years of education: N/A     Occupational History   • Not on file.     Social History Main Topics   • Smoking status: Never Smoker   • Smokeless tobacco: Never Used   • Alcohol use Yes      Comment: 1/day   • Drug use: No   • Sexual activity: Not on file     Other Topics Concern   • Not on file     Social History Narrative   • No narrative on file       OBJECTIVE:   6/5/2018 Procedure:   1)  ROBOTIC ASSISTED LAPAROSCOPIC PROCTOCOLECTOMY WITH END ILEOSTOMY  2) omentectomy      STOMA ASSESSMENT:   Type:  __x__Ileostomy     ____Colostomy    ____Urostomy    ____Other     Location:   RLQ   Size:1 inch    Shape:round   Color:red   Protrudes:      _x__ >1 inch               ___ <1 inch   [place]:  Central pointing slight medial and up   Mucocutaneous Junction:  intact   Skin indentations, creases or scar tissue:crease from 2-3 o'clock   Izzy-stomal Skin Problems:denudation from leakage 1-8 o'clock   Effluent / Flatus:soft/liquid brown   Photo  _x___ Yes ____ No            Pouching Procedure:   Old appliance removed.    Peristomal skin cleansed with:water, warm wash cloth   Peristomal skin treated with: strip of paste ring rolled and placed into crease at 2-3 o'clock, crusted with ostomy powder and No sting, Brava sheet CTF one inch, sprayed with medical adhesive and place   Ostomy appliance used:  [place] Ceraplus soft convex [id number] 57 mm with filter, CTF to one inch, sprayed with medical adhesive and placed. Framed with Brava  "strips, belt applied.       Patient Education: Instructed pt on pouch removal - pushing skin down while pulling up on adhesive wafer to minimize trauma to skin; to empty bag when 1/3-1/2 full; to change approx 2x/week, and PRN leakage; to clean with warm water and wash cloth or mild soap; to be sure not to use lotion or wipes; if using medical adhesive remover to cleanse with water and wash cloth after to remove all residue; how to measure stoma and cut pouch, that stoma size will change over 6-8 weeks and need to measure each week and adjust pouch size as necessary; on extra care to peristomal skin as output contains damaging enzymes; on crusting only when skin is irritated; not to use No Sting routinely under barrier because it interrupts adhesion; to close end of bag prior to putting on; that lubricating deodorant will not harm skin;  how to \"burp\" bag; that filter will help with gas, but \"wears out\" after 2-3 days; may also use Beano on food for gas; on gas  transit time 2-4 hours to small bowel; foods that may cause gas;  on rationale for placing piece of Adapt ring to crease, for Brava protective sheet to cover denuded skin, on need for soft convex as stoma in deeper crease when pt stands, that system may need tweaking at subsequent visits; how to apply belt; given Cely Burroughs, manager at [facility], phone number for supply problems; information on NAEEM. Pt verbalized understanding. Gave written instructions for procedure.   Verbal/demonstration instructions of above pouching procedure provided to patient/family.      Response: Pt lives in [place], only wants to come 1x/week. Questions regarding procedure answered, as above. Pt and  verbalized understanding.       PLAN:    Supplies Needed:   Appliance type:    Felicity [place] Ceraplus soft convex [id number] 57 mm with filter, CTF and matching 57 mm bag with filter             Other:      Accessories:   Adapt ring, Brava sheet, medical adhesive spray " and remover, stoma powder, No Sting, belt      Supplier:[employer]    Frequency:  1x/week      Professional Collaboration:  Evaluation notes forwarded to referring provider.      At the time of each visit, a thorough assessment of the patient is completed to assure appropriateness of our plan of care.  The plan of care may need to be adapted from the original plan of care to address any significant changes in patient status.    Clinician Signature:  _________________________________  Date:  ____________    Physician Signature:  ________________________________  Date:  ____________

## 2018-07-31 ENCOUNTER — NON-PROVIDER VISIT (OUTPATIENT)
Dept: WOUND CARE | Facility: MEDICAL CENTER | Age: 73
End: 2018-07-31
Attending: SURGERY
Payer: MEDICARE

## 2018-07-31 PROCEDURE — 99213 OFFICE O/P EST LOW 20 MIN: CPT

## 2018-07-31 NOTE — WOUND TEAM
"Advanced Wound Care  [facility]  [address]  [place] [address]  [phone] Fax#    Encounter Note:   For 90 Day Certification Period:  7/23/2018 - 10/23/2018    Referring Provider:  Dr. Yehuda Raya  Primary Provider:  Consulting Providers:  Surgeon:Dr. Yehuda Raya      Start of Care:7/23/2018  Reason for referral:new ileostomy      SUBJECTIVE:    HPI:From Dr. Raya' 6/5/2018 note: 73-year-old female with new diagnosis of stricture long-standing history of ulcerative colitis found to have colon cancer and the decision was made to proceed with a little robotic assisted laparoscopic proctocolectomy with end ileostomy.    Past Medical Hx:  Past Medical History:   Diagnosis Date   • Acid reflux 06/01/2018   • Arthritis 06/01/2018   • Asthma 06/01/2018    Uses Inhaler PRN   • Bladder infection 06/01/2018    Pt. currently being treated at this time & states Dr. Raya is aware of this   • Bowel habit changes 06/01/2018    \"Constipation/Diarrhea\"   • Breath shortness 06/01/2018    \"From Asthma & Allergies Sometimes\"   • Egg allergy 06/01/2018   • Falls     H/O   • Heart burn 06/01/2018   • Indigestion 06/01/2018   • Pain 06/01/2018    \"Feet\"   • Plantar fasciitis 06/01/2018   • Sensitive skin 06/01/2018   • UC (ulcerative colitis) ([facility])    • Urinary bladder disorder 06/01/2018    \"Bladder infection & Dr. Raya is aware of this\"   • Urinary incontinence 06/01/2018   Surgical Hx:  Past Surgical History:   Procedure Laterality Date   • LOW ANTERIOR RESECTION ROBOTIC XI  6/5/2018    Procedure: robotic assisted laparoscopic proctocolectomy with end ileostomy and omentectomy;  Surgeon: Yehuda Raya M.D.;  Location: SURGERY [facility];  Service: General   • GYN SURGERY  2001    Hysterectomy   • TUBAL LIGATION  1972   • APPENDECTOMY     • LUMPECTOMY      1972     Medications:  Current Outpatient Prescriptions:   •  ondansetron (ZOFRAN ODT) 4 MG TABLET DISPERSIBLE, " Take 1 Tab by mouth every 8 hours as needed., Disp: 10 Tab, Rfl: 2  •  Probiotic Product (ALIGN PO), Take 1 Dose by mouth every day., Disp: , Rfl:   •  omeprazole (PRILOSEC) 20 MG delayed-release capsule, Take 20 mg by mouth every day., Disp: , Rfl:   •  Multiple Vitamins-Calcium (VIACTIV MULTI-VITAMIN) Chew Tab, Take 1 Tab by mouth every day., Disp: , Rfl:   •  mesalamine (LIALDA) 1.2 GM Tablet Delayed Response, Take 2.4 g by mouth every day., Disp: , Rfl:   •  alendronate (FOSAMAX) 70 MG Tab, Take 70 mg by mouth every Monday., Disp: , Rfl:   Allergies:Bee; Eggs; Influenza vaccines; Pcn [penicillins]; and Shellfish allergy  Social Hx:  Social History     Social History   • Marital status:      Spouse name: N/A   • Number of children: N/A   • Years of education: N/A     Occupational History   • Not on file.     Social History Main Topics   • Smoking status: Never Smoker   • Smokeless tobacco: Never Used   • Alcohol use Yes      Comment: 1/day   • Drug use: No   • Sexual activity: Not on file     Other Topics Concern   • Not on file     Social History Narrative   • No narrative on file       OBJECTIVE:   6/5/2018 Procedure:   1)  ROBOTIC ASSISTED LAPAROSCOPIC PROCTOCOLECTOMY WITH END ILEOSTOMY  2) omentectomy      STOMA ASSESSMENT:   Type:  __x__Ileostomy     ____Colostomy    ____Urostomy    ____Other     Location:   RLQ   Size:1 inch    Shape:round   Color:red   Protrudes:      _x__ >1 inch               ___ <1 inch   [place]:  Central pointing slight medial and up   Mucocutaneous Junction:slight separation 5-7 PM   Skin indentations, creases or scar tissue:crease from 2-3 o'clock   Izzy-stomal Skin Problems:slight denudation from leakage 5-7 PM 1-8 o'clock   Effluent / Flatus:soft/liquid brown   Photo  ____ Yes ___x_ No            Pouching Procedure:   Old appliance removed.    Peristomal skin cleansed with:water, warm wash cloth   Peristomal skin treated with: Luxiq foam to areas several cms out from stoma  "(not peristomal skin)  that are itching (skin intact); very mild separation filled with ostomy powder, peristomal area crusted with ostomy powder and No sting   Ostomy appliance used:  [place] Ceraplus flat [id number] 57 mm with filter, CTF to one inch, corresponding 57 mm transparent bag applied. Belt applied.       Patient Education: Pt and  stated that they had had problems with leakage, a lot of difficulty removing wafer with spray. Skin had improved, went back to system they have at home which is flat 57 mm moldable Felicity wafer with cooresponding bag. They want to continue with this system and return in 3-4 weeks. Instructed on powder to slight separation and crusting to irritated skin, reviewed with ostomy booklet blockages and when to go to the ER, thorough chewing and drinking 10-12 glasses of water/fluid with electrolytes; that after approximately 2 months, may introduce fiber food one at a time, to chew food thoroughly and drink plenty of water and monitor for signs of blockage; (in response to question) that with ileostomy, pt will not experience \"constipation\" and should not take laxatives, instructed on small bowel output being liquid/pasty; on different suppliers ([employer]) as alternative to [employer]; on how to find [employer] online and [employer]; pt and  verbalized understanding.       Previous appointment:   Instructed pt on pouch removal - pushing skin down while pulling up on adhesive wafer to minimize trauma to skin; to empty bag when 1/3-1/2 full; to change approx 2x/week, and PRN leakage; to clean with warm water and wash cloth or mild soap;   to be sure not to use lotion or wipes; if using medical adhesive remover to cleanse with water and wash cloth after to remove all residue; how to measure stoma and cut pouch, that stoma size will change over 6-8 weeks and need to measure each week and adjust pouch size as necessary; on extra care to peristomal skin as output contains " "damaging enzymes; on crusting only when skin is irritated; not to use No Sting routinely under barrier because it interrupts adhesion; to close end of bag prior to putting on; that lubricating deodorant will not harm skin;  how to \"burp\" bag; that filter will help with gas, but \"wears out\" after 2-3 days; may also use Beano on food for gas; on gas  transit time 2-4 hours to small bowel; foods that may cause gas;  on rationale for placing piece of Adapt ring to crease, for Brava protective sheet to cover denuded skin, on need for soft convex as stoma in deeper crease when pt stands, that system may need tweaking at subsequent visits; how to apply belt; given Cely Burroughs, manager at [employer], phone number for supply problems; information on NAEEM. Pt verbalized understanding. Gave written instructions for procedure.   Verbal/demonstration instructions of above pouching procedure provided to patient/family.      Response: Pt lives in [place], only wants to return in 3-4 weeks. Pt and  verbalized understanding.       PLAN:    Supplies Needed:   Appliance type:    Felicity [place] Ceraplus [id number] 57 mm with filter, CTF and matching 57 mm bag with filter             Other:      Accessories:  stoma powder, No Sting, belt      Supplier:[employer]    Frequency:  appt in 3-4 weeks      Professional Collaboration:  none      At the time of each visit, a thorough assessment of the patient is completed to assure appropriateness of our plan of care.  The plan of care may need to be adapted from the original plan of care to address any significant changes in patient status.    Clinician Signature:  _________________________________  Date:  ____________    Physician Signature:  ________________________________  Date:  ____________       "

## 2018-08-27 ENCOUNTER — NON-PROVIDER VISIT (OUTPATIENT)
Dept: WOUND CARE | Facility: MEDICAL CENTER | Age: 73
End: 2018-08-27
Attending: SURGERY
Payer: MEDICARE

## 2018-08-27 PROCEDURE — 99211 OFF/OP EST MAY X REQ PHY/QHP: CPT

## 2018-08-27 NOTE — WOUND TEAM
"Advanced Wound Care  [facility]  [address]  [place] [address]  [phone] Fax#    Encounter Note:   For 90 Day Certification Period:  7/23/2018 - 10/23/2018    Referring Provider:  Dr. Yehuda Raya  Primary Provider:  Consulting Providers:  Surgeon:Dr. Yehuda Raya      Start of Care:7/23/2018  Reason for referral:new ileostomy      SUBJECTIVE:  \"I think I'm doing well.\"   HPI:From Dr. Raya' 6/5/2018 note: 73-year-old female with new diagnosis of stricture long-standing history of ulcerative colitis found to have colon cancer and the decision was made to proceed with a little robotic assisted laparoscopic proctocolectomy with end ileostomy.    Past Medical Hx:  Past Medical History:   Diagnosis Date   • Acid reflux 06/01/2018   • Arthritis 06/01/2018   • Asthma 06/01/2018    Uses Inhaler PRN   • Bladder infection 06/01/2018    Pt. currently being treated at this time & states Dr. Raya is aware of this   • Bowel habit changes 06/01/2018    \"Constipation/Diarrhea\"   • Breath shortness 06/01/2018    \"From Asthma & Allergies Sometimes\"   • Egg allergy 06/01/2018   • Falls     H/O   • Heart burn 06/01/2018   • Indigestion 06/01/2018   • Pain 06/01/2018    \"Feet\"   • Plantar fasciitis 06/01/2018   • Sensitive skin 06/01/2018   • UC (ulcerative colitis) ([facility])    • Urinary bladder disorder 06/01/2018    \"Bladder infection & Dr. Raya is aware of this\"   • Urinary incontinence 06/01/2018   Surgical Hx:  Past Surgical History:   Procedure Laterality Date   • LOW ANTERIOR RESECTION ROBOTIC XI  6/5/2018    Procedure: robotic assisted laparoscopic proctocolectomy with end ileostomy and omentectomy;  Surgeon: Yehuda Raya M.D.;  Location: SURGERY [facility];  Service: General   • GYN SURGERY  2001    Hysterectomy   • TUBAL LIGATION  1972   • APPENDECTOMY     • LUMPECTOMY      1972     Medications:  Current Outpatient Prescriptions:   •  ondansetron (ZOFRAN " ODT) 4 MG TABLET DISPERSIBLE, Take 1 Tab by mouth every 8 hours as needed., Disp: 10 Tab, Rfl: 2  •  Probiotic Product (ALIGN PO), Take 1 Dose by mouth every day., Disp: , Rfl:   •  omeprazole (PRILOSEC) 20 MG delayed-release capsule, Take 20 mg by mouth every day., Disp: , Rfl:   •  Multiple Vitamins-Calcium (VIACTIV MULTI-VITAMIN) Chew Tab, Take 1 Tab by mouth every day., Disp: , Rfl:   •  mesalamine (LIALDA) 1.2 GM Tablet Delayed Response, Take 2.4 g by mouth every day., Disp: , Rfl:   •  alendronate (FOSAMAX) 70 MG Tab, Take 70 mg by mouth every Monday., Disp: , Rfl:   Allergies:Bee; Eggs; Influenza vaccines; Pcn [penicillins]; and Shellfish allergy  Social Hx:      OBJECTIVE:   6/5/2018 Procedure:   1)  ROBOTIC ASSISTED LAPAROSCOPIC PROCTOCOLECTOMY WITH END ILEOSTOMY  2) omentectomy      STOMA ASSESSMENT:   Type:  __x__Ileostomy     ____Colostomy    ____Urostomy    ____Other     Location:   RLQ   Size:1 inch    Shape:round   Color:red   Protrudes:      _x__ >1 inch               ___ <1 inch   [place]:  Central pointing slight medial and up   Mucocutaneous Junction:intact   Skin indentations, creases or scar tissue:crease from 2-3 o'clock   Izzy-stomal Skin Problems:slight denudation 5-6 pm, two buds of hypergranulation at 7 o;clock   Effluent / Flatus:soft/liquid brown   Photo  ____ Yes ___x_ No            Pouching Procedure:   Old appliance removed.    Peristomal skin cleansed with: water, warm wash cloth   Peristomal skin treated with:  Silver nitrate to buds of hypergranulation, rinsed with copious amounts of NS, irritation to 5-6 o'clock and area surrounding crusted with ostomy powder and No sting, covered with Brava strip   Ostomy appliance used:  Felicity moldable [id number] 57 mm with filter, corresponding 57 mm transparent bag applied. Pt has belt at home.     Patient Education: 8/27/2018 Instructed pt on rationale for silver nitrate to hypergranulation; to change bag every three days if having leakage on the  "4th day; on s/s blockage partial and full (no output, pain, N/V and stop eating/drinking and go to ER); on chewing very thoroughly and drinking 10-12 glasses of fluid (including electrolyte replacement fluid) daily; on adding one fiber food slowly with thorough chewing and drinking lots of fluid and see tolerance; on use of lubricating deodorant to help stool slide out of bag; crusting process, rationale for Brava strip; on [employer] and schedule; on pouch removal technique to minimize trauma to skin, to empty bag when 1/3-1/1 full; on transit time for gas and food to small bowel, patient and  verbalized understanding.      Pt and  stated that they had had problems with leakage, a lot of difficulty removing wafer with spray. Skin had improved, went back to system they have at home which is flat 57 mm moldable [place] wafer with cooresponding bag. They want to continue with this system and return in 3-4 weeks. Instructed on powder to slight separation and crusting to irritated skin, reviewed with ostomy booklet blockages and when to go to the ER, thorough chewing and drinking 10-12 glasses of water/fluid with electrolytes; that after approximately 2 months, may introduce fiber food one at a time, to chew food thoroughly and drink plenty of water and monitor for signs of blockage; (in response to question) that with ileostomy, pt will not experience \"constipation\" and should not take laxatives, instructed on small bowel output being liquid/pasty; on different suppliers ([employer]) as alternative to [employer]; on how to find [employer] online and [employer]; pt and  verbalized understanding.       Previous appointment:   Instructed pt on pouch removal - pushing skin down while pulling up on adhesive wafer to minimize trauma to skin; to empty bag when 1/3-1/2 full; to change approx 2x/week, and PRN leakage; to clean with warm water and wash cloth or mild soap;   to be sure not to " "use lotion or wipes; if using medical adhesive remover to cleanse with water and wash cloth after to remove all residue; how to measure stoma and cut pouch, that stoma size will change over 6-8 weeks and need to measure each week and adjust pouch size as necessary; on extra care to peristomal skin as output contains damaging enzymes; on crusting only when skin is irritated; not to use No Sting routinely under barrier because it interrupts adhesion; to close end of bag prior to putting on; that lubricating deodorant will not harm skin;  how to \"burp\" bag; that filter will help with gas, but \"wears out\" after 2-3 days; may also use Beano on food for gas; on gas  transit time 2-4 hours to small bowel; foods that may cause gas;  on rationale for placing piece of Adapt ring to crease, for Brava protective sheet to cover denuded skin, on need for soft convex as stoma in deeper crease when pt stands, that system may need tweaking at subsequent visits; how to apply belt; given Cely Burroughs, manager at [facility], phone number for supply problems; information on NAEEM. Pt verbalized understanding. Gave written instructions for procedure.   Verbal/demonstration instructions of above pouching procedure provided to patient/family.      Response: Pt lives in [place], only wants to return in 3-4 weeks. Pt and  verbalized understanding.       PLAN:    Supplies Needed:   Appliance type:    Felicity [place] Ceraplus [id number] 57 mm with filter, CTF and matching 57 mm bag with filter             Other:      Accessories:  stoma powder, No Sting, belt      Supplier:[employer]    Frequency:  appt in approx 4 weeks      Professional Collaboration:  none      At the time of each visit, a thorough assessment of the patient is completed to assure appropriateness of our plan of care.  The plan of care may need to be adapted from the original plan of care to address any significant changes in patient status.    Clinician Signature:  " _________________________________  Date:  ____________    Physician Signature:  ________________________________  Date:  ____________

## 2018-09-25 ENCOUNTER — NON-PROVIDER VISIT (OUTPATIENT)
Dept: WOUND CARE | Facility: MEDICAL CENTER | Age: 73
End: 2018-09-25
Attending: SURGERY
Payer: MEDICARE

## 2018-09-25 PROCEDURE — 99211 OFF/OP EST MAY X REQ PHY/QHP: CPT

## 2018-09-25 NOTE — WOUND TEAM
"[facility]  [facility]  [address]  [place] [address]  [phone] Fax#    Encounter Note:   For 90 Day Certification Period:  7/23/2018 - 10/23/2018    Referring Provider:  Dr. Yehuda Raya  Primary Provider:  Consulting Providers:  Surgeon:Dr. Yehuda Raya      Start of Care:7/23/2018  Reason for referral:new ileostomy      SUBJECTIVE:  \"I'm changing the pouch 2x/week now and it seems to be working.\"   HPI:From Dr. Raya' 6/5/2018 note: 73-year-old female with new diagnosis of stricture long-standing history of ulcerative colitis found to have colon cancer and the decision was made to proceed with a little robotic assisted laparoscopic proctocolectomy with end ileostomy.    Past Medical Hx:  Past Medical History:   Diagnosis Date   • Acid reflux 06/01/2018   • Arthritis 06/01/2018   • Asthma 06/01/2018    Uses Inhaler PRN   • Bladder infection 06/01/2018    Pt. currently being treated at this time & states Dr. Raya is aware of this   • Bowel habit changes 06/01/2018    \"Constipation/Diarrhea\"   • Breath shortness 06/01/2018    \"From Asthma & Allergies Sometimes\"   • Egg allergy 06/01/2018   • Falls     H/O   • Heart burn 06/01/2018   • Indigestion 06/01/2018   • Pain 06/01/2018    \"Feet\"   • Plantar fasciitis 06/01/2018   • Sensitive skin 06/01/2018   • UC (ulcerative colitis) ([facility])    • Urinary bladder disorder 06/01/2018    \"Bladder infection & Dr. Raya is aware of this\"   • Urinary incontinence 06/01/2018   Surgical Hx:  Past Surgical History:   Procedure Laterality Date   • LOW ANTERIOR RESECTION ROBOTIC XI  6/5/2018    Procedure: robotic assisted laparoscopic proctocolectomy with end ileostomy and omentectomy;  Surgeon: Yehuda Raya M.D.;  Location: SURGERY [facility];  Service: General   • GYN SURGERY  2001    Hysterectomy   • TUBAL LIGATION  1972   • APPENDECTOMY     • LUMPECTOMY      1972     Medications:  Current Outpatient " Prescriptions:   •  ondansetron (ZOFRAN ODT) 4 MG TABLET DISPERSIBLE, Take 1 Tab by mouth every 8 hours as needed., Disp: 10 Tab, Rfl: 2  •  Probiotic Product (ALIGN PO), Take 1 Dose by mouth every day., Disp: , Rfl:   •  omeprazole (PRILOSEC) 20 MG delayed-release capsule, Take 20 mg by mouth every day., Disp: , Rfl:   •  Multiple Vitamins-Calcium (VIACTIV MULTI-VITAMIN) Chew Tab, Take 1 Tab by mouth every day., Disp: , Rfl:   •  mesalamine (LIALDA) 1.2 GM Tablet Delayed Response, Take 2.4 g by mouth every day., Disp: , Rfl:   •  alendronate (FOSAMAX) 70 MG Tab, Take 70 mg by mouth every Monday., Disp: , Rfl:   Allergies:Bee; Eggs; Influenza vaccines; Pcn [penicillins]; and Shellfish allergy  Social Hx:      OBJECTIVE:   6/5/2018 Procedure:   1)  ROBOTIC ASSISTED LAPAROSCOPIC PROCTOCOLECTOMY WITH END ILEOSTOMY  2) omentectomy      STOMA ASSESSMENT:   Type:  __x__Ileostomy     ____Colostomy    ____Urostomy    ____Other     Location:   RLQ   Size:1 inch    Shape:round   Color:red   Protrudes:      _x__ >1 inch               ___ <1 inch   [place]:  Central pointing slight medial and up   Mucocutaneous Junction:intact   Skin indentations, creases or scar tissue:crease from 2-3 o'clock   Izzy-stomal Skin Problems:slight denudation 3-6 pm (nearly resolved)   Effluent / Flatus:soft/liquid brown   Photo  ____ Yes ___x_ No            Pouching Procedure:   Old appliance removed.    Peristomal skin cleansed with: water, warm wash cloth   Peristomal skin treated with: crusting x2 with stoma powder and no sting skin barrier   Ostomy appliance used:  Medical adhesive, followed by [place] adapt ring right on peristoma to get optimal coverage, then medical adhesive spray again. [place] moldable [id number] 57 mm with filter over ring (placed it as a david today for extra support), corresponding 57 mm transparent bag applied. belt applied.    Patient Education: reviewed all previous education below.  Also showed pt a sample of the  stealth belt and pictures of other abdominal belts/binders, options for more comfortable ostomy underwear ([email or web address]) per pt request, and answered questions regarding ileostomy.  Reviewed s/s of complications and when to seek MD/go to ER.  Pt also instructed to keep a food diary and output diary.  Pt agreed to do this and bring it to next appt in 2-4 weeks.  Pt and  verbalized understanding to all.    8/27/2018 Instructed pt on rationale for silver nitrate to hypergranulation; to change bag every three days if having leakage on the 4th day; on s/s blockage partial and full (no output, pain, N/V and stop eating/drinking and go to ER); on chewing very thoroughly and drinking 10-12 glasses of fluid (including electrolyte replacement fluid) daily; on adding one fiber food slowly with thorough chewing and drinking lots of fluid and see tolerance; on use of lubricating deodorant to help stool slide out of bag; crusting process, rationale for Brava strip; on [employer] and schedule; on pouch removal technique to minimize trauma to skin, to empty bag when 1/3-1/1 full; on transit time for gas and food to small bowel, patient and  verbalized understanding.      Pt and  stated that they had had problems with leakage, a lot of difficulty removing wafer with spray. Skin had improved, went back to system they have at home which is flat 57 mm moldable [place] wafer with cooresponding bag. They want to continue with this system and return in 3-4 weeks. Instructed on powder to slight separation and crusting to irritated skin, reviewed with ostomy booklet blockages and when to go to the ER, thorough chewing and drinking 10-12 glasses of water/fluid with electrolytes; that after approximately 2 months, may introduce fiber food one at a time, to chew food thoroughly and drink plenty of water and monitor for signs of blockage; (in response to question) that with ileostomy, pt will not  "experience \"constipation\" and should not take laxatives, instructed on small bowel output being liquid/pasty; on different suppliers ([employer]) as alternative to [employer]; on how to find [employer] online and [employer]; pt and  verbalized understanding.       Previous appointment:   Instructed pt on pouch removal - pushing skin down while pulling up on adhesive wafer to minimize trauma to skin; to empty bag when 1/3-1/2 full; to change approx 2x/week, and PRN leakage; to clean with warm water and wash cloth or mild soap;   to be sure not to use lotion or wipes; if using medical adhesive remover to cleanse with water and wash cloth after to remove all residue; how to measure stoma and cut pouch, that stoma size will change over 6-8 weeks and need to measure each week and adjust pouch size as necessary; on extra care to peristomal skin as output contains damaging enzymes; on crusting only when skin is irritated; not to use No Sting routinely under barrier because it interrupts adhesion; to close end of bag prior to putting on; that lubricating deodorant will not harm skin;  how to \"burp\" bag; that filter will help with gas, but \"wears out\" after 2-3 days; may also use Beano on food for gas; on gas  transit time 2-4 hours to small bowel; foods that may cause gas;  on rationale for placing piece of Adapt ring to crease, for Brava protective sheet to cover denuded skin, on need for soft convex as stoma in deeper crease when pt stands, that system may need tweaking at subsequent visits; how to apply belt; given Cely Burroughs, manager at [employer], phone number for supply problems; information on [employer]. Pt verbalized understanding. Gave written instructions for procedure.   Verbal/demonstration instructions of above pouching procedure provided to patient/family.      Response: Pt lives in [place], only wants to return in 3-4 weeks. Pt and  verbalized understanding.       PLAN:    Supplies " Needed:   Appliance type:    [place] moldable     Accessories:  Adhesive spray, adapt ring, stoma powder, No Sting, belt      Supplier:[employer]    Frequency:  appt in approx 2-4 weeks      Professional Collaboration:  none      At the time of each visit, a thorough assessment of the patient is completed to assure appropriateness of our plan of care.  The plan of care may need to be adapted from the original plan of care to address any significant changes in patient status.    Clinician Signature:  _________________________________  Date:  ____________    Physician Signature:  ________________________________  Date:  ____________

## 2018-10-24 ENCOUNTER — APPOINTMENT (OUTPATIENT)
Dept: WOUND CARE | Facility: MEDICAL CENTER | Age: 73
End: 2018-10-24
Attending: SURGERY
Payer: MEDICARE

## 2021-01-13 DIAGNOSIS — Z23 NEED FOR VACCINATION: ICD-10-CM

## (undated) DEVICE — SUTURE 3-0 VICRYL PLUS SH - 8X 18 INCH (12/BX)

## (undated) DEVICE — TRAY CATHETER FOLEY URINE METER W/STATLOCK 350ML (10EA/CA)

## (undated) DEVICE — KIT ANESTHESIA W/CIRCUIT & 3/LT BAG W/FILTER (20EA/CA)

## (undated) DEVICE — GLOVE BIOGEL PI INDICATOR SZ 6.5 SURGICAL PF LF - (50/BX 4BX/CA)

## (undated) DEVICE — DRAPE ARM  BOX OF 20

## (undated) DEVICE — PAD OR TABLE DA VINCI 2IN X 20IN X 72IN - (12EA/CA)

## (undated) DEVICE — ROBOTIC SURGERY SERVICES

## (undated) DEVICE — ELECTRODE DUAL RETURN W/ CORD - (50/PK)

## (undated) DEVICE — CLIP HEMOLOCK PURPLE - (14/BX)

## (undated) DEVICE — BLOCK

## (undated) DEVICE — GLOVE BIOGEL SZ 8 SURGICAL PF LTX - (50PR/BX 4BX/CA)

## (undated) DEVICE — GLOVE BIOGEL SZ 6 PF LATEX - (50EA/BX 4BX/CA)

## (undated) DEVICE — TROCAR 5X100 NON BLADED Z-TH - READ KII (6/BX)

## (undated) DEVICE — SUTURE 4-0 MONOCRYL PLUS PS-2 - 27 INCH (36/BX)

## (undated) DEVICE — SEAL CANNULA STAPLER 12 MM (10EA/BX)

## (undated) DEVICE — SUTURE 0 LIGATING REEL VICRYL PLUS (12PK/BX)

## (undated) DEVICE — GELPORT 120MM - (1/EA)

## (undated) DEVICE — SET EXTENSION WITH 2 PORTS (48EA/CA) ***PART #2C8610 IS A SUBSTITUTE*****

## (undated) DEVICE — ELECTRODE 850 FOAM ADHESIVE - HYDROGEL RADIOTRNSPRNT (50/PK)

## (undated) DEVICE — DRAPE MAYO STAND - (30/CA)

## (undated) DEVICE — SENSOR SPO2 NEO LNCS ADHESIVE (20/BX) SEE USER NOTES

## (undated) DEVICE — TUBE E-T HI-LO CUFF 7.5MM (10EA/PK)

## (undated) DEVICE — HEAD HOLDER JUNIOR/ADULT

## (undated) DEVICE — OBTURATOR BLADELESS STANDARD 8MM (6EA/BX)

## (undated) DEVICE — SUTURE 1 PDS PLUS CT-1 36IN (36EA/BX)

## (undated) DEVICE — BLADE SURGICAL CLIPPER - (50EA/CA)

## (undated) DEVICE — Device

## (undated) DEVICE — KIT ROOM DECONTAMINATION

## (undated) DEVICE — GOWN SURGEONS LARGE - (32/CA)

## (undated) DEVICE — PROTECTOR ULNA NERVE - (36PR/CA)

## (undated) DEVICE — DRAPE STRLE REG TOWEL 18X24 - (10/BX 4BX/CA)"

## (undated) DEVICE — SEALER VESSEL HARMONIC ACE PLUS WITH ADVANCED HEMOSTASIS 36CM (1/EA)

## (undated) DEVICE — SHEARS MONOPOLAR CURVED  DA VINCI 10X'S REUSABLE

## (undated) DEVICE — REDUCER XI STAPLER 12MM TO 8MM (6EA/BX)

## (undated) DEVICE — TOWELS CLOTH SURGICAL - (4/PK 20PK/CA)

## (undated) DEVICE — COVER TIP ENDOWRIST HOT SHEAR - (10EA/BX) DA VINCI

## (undated) DEVICE — MASK ANESTHESIA ADULT  - (100/CA)

## (undated) DEVICE — SUTURE GENERAL

## (undated) DEVICE — LACTATED RINGERS INJ 1000 ML - (14EA/CA 60CA/PF)

## (undated) DEVICE — SEAL 5MM-8MM UNIVERSAL  BOX OF 10

## (undated) DEVICE — SET SUCTION/IRRIGATION WITH DISPOSABLE TIP (6/CA )PART #0250-070-520 IS A SUB

## (undated) DEVICE — GLOVE BIOGEL SZ 6.5 SURGICAL PF LTX (50PR/BX 4BX/CA)

## (undated) DEVICE — RELOAD STAPLER GREEN ENDOWRIST 45 (12EA/BX)

## (undated) DEVICE — SODIUM CHL IRRIGATION 0.9% 1000ML (12EA/CA)

## (undated) DEVICE — DERMABOND ADVANCED - (12EA/BX)

## (undated) DEVICE — SEALER VESSEL DA VINCI XI (6EA/CA)

## (undated) DEVICE — NEPTUNE 4 PORT MANIFOLD - (20/PK)

## (undated) DEVICE — GOWN WARMING STANDARD FLEX - (30/CA)

## (undated) DEVICE — GOWN SURGEONS X-LARGE - DISP. (30/CA)

## (undated) DEVICE — SET LEADWIRE 5 LEAD BEDSIDE DISPOSABLE ECG (1SET OF 5/EA)

## (undated) DEVICE — DRAPE COLUMN  BOX OF 20

## (undated) DEVICE — TUBING CLEARLINK DUO-VENT - C-FLO (48EA/CA)

## (undated) DEVICE — STAPLER 29MM CIRCULAR CURVED - (3EA/BX)

## (undated) DEVICE — FORCEPS FENESTRATED BIPOLAR DA VINCI 10X'S REUSABLE

## (undated) DEVICE — KIT 2.25IN COL ILSTM 2 PC DRN - 57MM 2 1/4 INCH THIS IS FOR THE OR ONLY (5/BX)

## (undated) DEVICE — KIT SIGMOIDOSCOPE W/BULB AND - SUCTION (1/PK 10PK/CA)

## (undated) DEVICE — SUCTION INSTRUMENT YANKAUER BULBOUS TIP W/O VENT (50EA/CA)

## (undated) DEVICE — RELOAD STAPLER WHITE ENDOWRIST 45 (12EA/BX)

## (undated) DEVICE — CANISTER SUCTION 3000ML MECHANICAL FILTER AUTO SHUTOFF MEDI-VAC NONSTERILE LF DISP  (40EA/CA)

## (undated) DEVICE — PAD LAP STERILE 18 X 18 - (5/PK 40PK/CA)

## (undated) DEVICE — SLEEVE, VASO, THIGH, MED

## (undated) DEVICE — WATER IRRIG. STER. 1500 ML - (9/CA)

## (undated) DEVICE — CHLORAPREP 26 ML APPLICATOR - ORANGE TINT(25/CA)

## (undated) DEVICE — SHEATH STAPLER 45 DAVINCI (10EA/BX)